# Patient Record
Sex: FEMALE | Race: WHITE | NOT HISPANIC OR LATINO
[De-identification: names, ages, dates, MRNs, and addresses within clinical notes are randomized per-mention and may not be internally consistent; named-entity substitution may affect disease eponyms.]

---

## 2017-04-14 ENCOUNTER — TRANSCRIPTION ENCOUNTER (OUTPATIENT)
Age: 62
End: 2017-04-14

## 2017-06-07 ENCOUNTER — APPOINTMENT (OUTPATIENT)
Dept: ENDOCRINOLOGY | Facility: CLINIC | Age: 62
End: 2017-06-07

## 2017-06-07 VITALS
HEART RATE: 55 BPM | SYSTOLIC BLOOD PRESSURE: 102 MMHG | HEIGHT: 61 IN | BODY MASS INDEX: 21.14 KG/M2 | OXYGEN SATURATION: 93 % | DIASTOLIC BLOOD PRESSURE: 62 MMHG | WEIGHT: 112 LBS

## 2017-06-07 RX ORDER — DENOSUMAB 60 MG/ML
60 INJECTION SUBCUTANEOUS
Qty: 1 | Refills: 0 | Status: COMPLETED | OUTPATIENT
Start: 2017-06-07

## 2017-06-07 RX ADMIN — DENOSUMAB 0 MG/ML: 60 INJECTION SUBCUTANEOUS at 00:00

## 2017-09-11 ENCOUNTER — TRANSCRIPTION ENCOUNTER (OUTPATIENT)
Age: 62
End: 2017-09-11

## 2017-10-31 ENCOUNTER — TRANSCRIPTION ENCOUNTER (OUTPATIENT)
Age: 62
End: 2017-10-31

## 2017-11-08 ENCOUNTER — TRANSCRIPTION ENCOUNTER (OUTPATIENT)
Age: 62
End: 2017-11-08

## 2017-12-11 ENCOUNTER — APPOINTMENT (OUTPATIENT)
Dept: ENDOCRINOLOGY | Facility: CLINIC | Age: 62
End: 2017-12-11
Payer: COMMERCIAL

## 2017-12-11 VITALS
HEIGHT: 61 IN | OXYGEN SATURATION: 95 % | HEART RATE: 66 BPM | SYSTOLIC BLOOD PRESSURE: 120 MMHG | DIASTOLIC BLOOD PRESSURE: 70 MMHG | WEIGHT: 110 LBS | BODY MASS INDEX: 20.77 KG/M2

## 2017-12-11 PROCEDURE — 96372 THER/PROPH/DIAG INJ SC/IM: CPT

## 2017-12-11 PROCEDURE — 99214 OFFICE O/P EST MOD 30 MIN: CPT | Mod: 25

## 2017-12-11 RX ORDER — MIRTAZAPINE 15 MG/1
15 TABLET, FILM COATED ORAL
Qty: 30 | Refills: 0 | Status: COMPLETED | COMMUNITY
Start: 2017-10-14

## 2017-12-11 RX ORDER — WARFARIN 10 MG/1
10 TABLET ORAL
Qty: 90 | Refills: 0 | Status: COMPLETED | COMMUNITY
Start: 2017-07-03

## 2017-12-11 RX ORDER — DENOSUMAB 60 MG/ML
60 INJECTION SUBCUTANEOUS
Qty: 0 | Refills: 0 | Status: COMPLETED | OUTPATIENT
Start: 2017-12-11

## 2017-12-11 RX ORDER — WARFARIN 2.5 MG/1
2.5 TABLET ORAL
Qty: 90 | Refills: 0 | Status: COMPLETED | COMMUNITY
Start: 2017-07-03

## 2017-12-11 RX ORDER — METOPROLOL SUCCINATE 25 MG/1
25 TABLET, EXTENDED RELEASE ORAL
Qty: 90 | Refills: 0 | Status: COMPLETED | COMMUNITY
Start: 2017-06-25

## 2017-12-11 RX ORDER — TOBRAMYCIN AND DEXAMETHASONE 3; 1 MG/G; MG/G
0.3-0.1 OINTMENT OPHTHALMIC
Qty: 4 | Refills: 0 | Status: COMPLETED | COMMUNITY
Start: 2017-11-28

## 2017-12-11 RX ORDER — AMITRIPTYLINE HYDROCHLORIDE 10 MG/1
10 TABLET, FILM COATED ORAL
Qty: 180 | Refills: 0 | Status: COMPLETED | COMMUNITY
Start: 2017-06-26

## 2017-12-11 RX ADMIN — DENOSUMAB 0 MG/ML: 60 INJECTION SUBCUTANEOUS at 00:00

## 2018-02-02 ENCOUNTER — TRANSCRIPTION ENCOUNTER (OUTPATIENT)
Age: 63
End: 2018-02-02

## 2018-02-07 ENCOUNTER — TRANSCRIPTION ENCOUNTER (OUTPATIENT)
Age: 63
End: 2018-02-07

## 2018-03-27 ENCOUNTER — TRANSCRIPTION ENCOUNTER (OUTPATIENT)
Age: 63
End: 2018-03-27

## 2018-04-07 ENCOUNTER — TRANSCRIPTION ENCOUNTER (OUTPATIENT)
Age: 63
End: 2018-04-07

## 2018-04-13 ENCOUNTER — EMERGENCY (EMERGENCY)
Facility: HOSPITAL | Age: 63
LOS: 1 days | Discharge: HOME | End: 2018-04-13
Attending: EMERGENCY MEDICINE
Payer: COMMERCIAL

## 2018-04-13 ENCOUNTER — OUTPATIENT (OUTPATIENT)
Dept: OUTPATIENT SERVICES | Facility: HOSPITAL | Age: 63
LOS: 1 days | End: 2018-04-13
Payer: COMMERCIAL

## 2018-04-13 ENCOUNTER — APPOINTMENT (OUTPATIENT)
Dept: CT IMAGING | Facility: CLINIC | Age: 63
End: 2018-04-13
Payer: COMMERCIAL

## 2018-04-13 VITALS
HEART RATE: 67 BPM | TEMPERATURE: 99 F | OXYGEN SATURATION: 99 % | RESPIRATION RATE: 19 BRPM | SYSTOLIC BLOOD PRESSURE: 169 MMHG | DIASTOLIC BLOOD PRESSURE: 79 MMHG

## 2018-04-13 VITALS
OXYGEN SATURATION: 99 % | RESPIRATION RATE: 16 BRPM | DIASTOLIC BLOOD PRESSURE: 69 MMHG | SYSTOLIC BLOOD PRESSURE: 108 MMHG | HEART RATE: 75 BPM

## 2018-04-13 DIAGNOSIS — Z00.8 ENCOUNTER FOR OTHER GENERAL EXAMINATION: ICD-10-CM

## 2018-04-13 LAB
ALBUMIN SERPL ELPH-MCNC: 4.3 G/DL — SIGNIFICANT CHANGE UP (ref 3.3–5)
ALP SERPL-CCNC: 41 U/L — SIGNIFICANT CHANGE UP (ref 40–120)
ALT FLD-CCNC: 24 U/L RC — SIGNIFICANT CHANGE UP (ref 10–45)
ANION GAP SERPL CALC-SCNC: 16 MMOL/L — SIGNIFICANT CHANGE UP (ref 5–17)
APPEARANCE UR: CLEAR — SIGNIFICANT CHANGE UP
APTT BLD: 48 SEC — HIGH (ref 27.5–37.4)
AST SERPL-CCNC: 25 U/L — SIGNIFICANT CHANGE UP (ref 10–40)
BASE EXCESS BLDV CALC-SCNC: 1.8 MMOL/L — SIGNIFICANT CHANGE UP (ref -2–2)
BASOPHILS # BLD AUTO: 0 K/UL — SIGNIFICANT CHANGE UP (ref 0–0.2)
BASOPHILS NFR BLD AUTO: 0.2 % — SIGNIFICANT CHANGE UP (ref 0–2)
BILIRUB SERPL-MCNC: 0.9 MG/DL — SIGNIFICANT CHANGE UP (ref 0.2–1.2)
BILIRUB UR-MCNC: NEGATIVE — SIGNIFICANT CHANGE UP
BUN SERPL-MCNC: 20 MG/DL — SIGNIFICANT CHANGE UP (ref 7–23)
CA-I SERPL-SCNC: 1.05 MMOL/L — LOW (ref 1.12–1.3)
CALCIUM SERPL-MCNC: 8.4 MG/DL — SIGNIFICANT CHANGE UP (ref 8.4–10.5)
CHLORIDE BLDV-SCNC: 100 MMOL/L — SIGNIFICANT CHANGE UP (ref 96–108)
CHLORIDE SERPL-SCNC: 94 MMOL/L — LOW (ref 96–108)
CO2 BLDV-SCNC: 28 MMOL/L — SIGNIFICANT CHANGE UP (ref 22–30)
CO2 SERPL-SCNC: 23 MMOL/L — SIGNIFICANT CHANGE UP (ref 22–31)
COLOR SPEC: YELLOW — SIGNIFICANT CHANGE UP
CREAT SERPL-MCNC: 0.69 MG/DL — SIGNIFICANT CHANGE UP (ref 0.5–1.3)
DIFF PNL FLD: ABNORMAL
EOSINOPHIL # BLD AUTO: 0 K/UL — SIGNIFICANT CHANGE UP (ref 0–0.5)
EOSINOPHIL NFR BLD AUTO: 0.1 % — SIGNIFICANT CHANGE UP (ref 0–6)
GAS PNL BLDV: 128 MMOL/L — LOW (ref 136–145)
GAS PNL BLDV: SIGNIFICANT CHANGE UP
GAS PNL BLDV: SIGNIFICANT CHANGE UP
GLUCOSE BLDV-MCNC: 106 MG/DL — HIGH (ref 70–99)
GLUCOSE SERPL-MCNC: 107 MG/DL — HIGH (ref 70–99)
GLUCOSE UR QL: NEGATIVE — SIGNIFICANT CHANGE UP
HCO3 BLDV-SCNC: 27 MMOL/L — SIGNIFICANT CHANGE UP (ref 21–29)
HCT VFR BLD CALC: 38.6 % — SIGNIFICANT CHANGE UP (ref 34.5–45)
HCT VFR BLDA CALC: 39 % — SIGNIFICANT CHANGE UP (ref 39–50)
HGB BLD CALC-MCNC: 12.6 G/DL — SIGNIFICANT CHANGE UP (ref 11.5–15.5)
HGB BLD-MCNC: 12.9 G/DL — SIGNIFICANT CHANGE UP (ref 11.5–15.5)
INR BLD: 2.83 RATIO — HIGH (ref 0.88–1.16)
KETONES UR-MCNC: ABNORMAL
LACTATE BLDV-MCNC: 2 MMOL/L — SIGNIFICANT CHANGE UP (ref 0.7–2)
LEUKOCYTE ESTERASE UR-ACNC: NEGATIVE — SIGNIFICANT CHANGE UP
LYMPHOCYTES # BLD AUTO: 0.9 K/UL — LOW (ref 1–3.3)
LYMPHOCYTES # BLD AUTO: 9 % — LOW (ref 13–44)
MCHC RBC-ENTMCNC: 30.7 PG — SIGNIFICANT CHANGE UP (ref 27–34)
MCHC RBC-ENTMCNC: 33.6 GM/DL — SIGNIFICANT CHANGE UP (ref 32–36)
MCV RBC AUTO: 91.5 FL — SIGNIFICANT CHANGE UP (ref 80–100)
MONOCYTES # BLD AUTO: 0.5 K/UL — SIGNIFICANT CHANGE UP (ref 0–0.9)
MONOCYTES NFR BLD AUTO: 4.8 % — SIGNIFICANT CHANGE UP (ref 2–14)
NEUTROPHILS # BLD AUTO: 8.2 K/UL — HIGH (ref 1.8–7.4)
NEUTROPHILS NFR BLD AUTO: 85.9 % — HIGH (ref 43–77)
NITRITE UR-MCNC: NEGATIVE — SIGNIFICANT CHANGE UP
OTHER CELLS CSF MANUAL: 9 ML/DL — LOW (ref 18–22)
PCO2 BLDV: 45 MMHG — SIGNIFICANT CHANGE UP (ref 35–50)
PH BLDV: 7.39 — SIGNIFICANT CHANGE UP (ref 7.35–7.45)
PH UR: 6.5 — SIGNIFICANT CHANGE UP (ref 5–8)
PLATELET # BLD AUTO: 212 K/UL — SIGNIFICANT CHANGE UP (ref 150–400)
PO2 BLDV: 31 MMHG — SIGNIFICANT CHANGE UP (ref 25–45)
POTASSIUM BLDV-SCNC: 3.5 MMOL/L — SIGNIFICANT CHANGE UP (ref 3.5–5)
POTASSIUM SERPL-MCNC: 3.8 MMOL/L — SIGNIFICANT CHANGE UP (ref 3.5–5.3)
POTASSIUM SERPL-SCNC: 3.8 MMOL/L — SIGNIFICANT CHANGE UP (ref 3.5–5.3)
PROT SERPL-MCNC: 7.2 G/DL — SIGNIFICANT CHANGE UP (ref 6–8.3)
PROT UR-MCNC: 100 MG/DL
PROTHROM AB SERPL-ACNC: 31.2 SEC — HIGH (ref 9.8–12.7)
RBC # BLD: 4.22 M/UL — SIGNIFICANT CHANGE UP (ref 3.8–5.2)
RBC # FLD: 11.9 % — SIGNIFICANT CHANGE UP (ref 10.3–14.5)
RBC CASTS # UR COMP ASSIST: >50 /HPF (ref 0–2)
SAO2 % BLDV: 52 % — LOW (ref 67–88)
SODIUM SERPL-SCNC: 133 MMOL/L — LOW (ref 135–145)
SP GR SPEC: 1.02 — SIGNIFICANT CHANGE UP (ref 1.01–1.02)
UROBILINOGEN FLD QL: NEGATIVE — SIGNIFICANT CHANGE UP
WBC # BLD: 9.5 K/UL — SIGNIFICANT CHANGE UP (ref 3.8–10.5)
WBC # FLD AUTO: 9.5 K/UL — SIGNIFICANT CHANGE UP (ref 3.8–10.5)
WBC UR QL: SIGNIFICANT CHANGE UP /HPF (ref 0–5)

## 2018-04-13 PROCEDURE — 82803 BLOOD GASES ANY COMBINATION: CPT

## 2018-04-13 PROCEDURE — 83605 ASSAY OF LACTIC ACID: CPT

## 2018-04-13 PROCEDURE — 87086 URINE CULTURE/COLONY COUNT: CPT

## 2018-04-13 PROCEDURE — 96376 TX/PRO/DX INJ SAME DRUG ADON: CPT

## 2018-04-13 PROCEDURE — 85730 THROMBOPLASTIN TIME PARTIAL: CPT

## 2018-04-13 PROCEDURE — 80053 COMPREHEN METABOLIC PANEL: CPT

## 2018-04-13 PROCEDURE — 84132 ASSAY OF SERUM POTASSIUM: CPT

## 2018-04-13 PROCEDURE — 82947 ASSAY GLUCOSE BLOOD QUANT: CPT

## 2018-04-13 PROCEDURE — 85027 COMPLETE CBC AUTOMATED: CPT

## 2018-04-13 PROCEDURE — 82435 ASSAY OF BLOOD CHLORIDE: CPT

## 2018-04-13 PROCEDURE — 96374 THER/PROPH/DIAG INJ IV PUSH: CPT

## 2018-04-13 PROCEDURE — 81001 URINALYSIS AUTO W/SCOPE: CPT

## 2018-04-13 PROCEDURE — 74176 CT ABD & PELVIS W/O CONTRAST: CPT

## 2018-04-13 PROCEDURE — 99284 EMERGENCY DEPT VISIT MOD MDM: CPT | Mod: 25

## 2018-04-13 PROCEDURE — 99284 EMERGENCY DEPT VISIT MOD MDM: CPT

## 2018-04-13 PROCEDURE — 85610 PROTHROMBIN TIME: CPT

## 2018-04-13 PROCEDURE — 74176 CT ABD & PELVIS W/O CONTRAST: CPT | Mod: 26

## 2018-04-13 PROCEDURE — 82330 ASSAY OF CALCIUM: CPT

## 2018-04-13 PROCEDURE — 84295 ASSAY OF SERUM SODIUM: CPT

## 2018-04-13 PROCEDURE — 85014 HEMATOCRIT: CPT

## 2018-04-13 PROCEDURE — 96375 TX/PRO/DX INJ NEW DRUG ADDON: CPT

## 2018-04-13 RX ORDER — OXYCODONE HYDROCHLORIDE 5 MG/1
1 TABLET ORAL
Qty: 6 | Refills: 0 | OUTPATIENT
Start: 2018-04-13 | End: 2018-04-14

## 2018-04-13 RX ORDER — ACETAMINOPHEN 500 MG
1000 TABLET ORAL ONCE
Qty: 0 | Refills: 0 | Status: COMPLETED | OUTPATIENT
Start: 2018-04-13 | End: 2018-04-13

## 2018-04-13 RX ORDER — ONDANSETRON 8 MG/1
4 TABLET, FILM COATED ORAL ONCE
Qty: 0 | Refills: 0 | Status: COMPLETED | OUTPATIENT
Start: 2018-04-13 | End: 2018-04-13

## 2018-04-13 RX ORDER — MORPHINE SULFATE 50 MG/1
4 CAPSULE, EXTENDED RELEASE ORAL ONCE
Qty: 0 | Refills: 0 | Status: DISCONTINUED | OUTPATIENT
Start: 2018-04-13 | End: 2018-04-13

## 2018-04-13 RX ORDER — SODIUM CHLORIDE 9 MG/ML
1000 INJECTION INTRAMUSCULAR; INTRAVENOUS; SUBCUTANEOUS
Qty: 0 | Refills: 0 | Status: DISCONTINUED | OUTPATIENT
Start: 2018-04-13 | End: 2018-04-17

## 2018-04-13 RX ORDER — SODIUM CHLORIDE 9 MG/ML
1000 INJECTION INTRAMUSCULAR; INTRAVENOUS; SUBCUTANEOUS ONCE
Qty: 0 | Refills: 0 | Status: COMPLETED | OUTPATIENT
Start: 2018-04-13 | End: 2018-04-13

## 2018-04-13 RX ADMIN — MORPHINE SULFATE 4 MILLIGRAM(S): 50 CAPSULE, EXTENDED RELEASE ORAL at 21:21

## 2018-04-13 RX ADMIN — SODIUM CHLORIDE 150 MILLILITER(S): 9 INJECTION INTRAMUSCULAR; INTRAVENOUS; SUBCUTANEOUS at 21:59

## 2018-04-13 RX ADMIN — Medication 400 MILLIGRAM(S): at 19:57

## 2018-04-13 RX ADMIN — ONDANSETRON 4 MILLIGRAM(S): 8 TABLET, FILM COATED ORAL at 21:21

## 2018-04-13 RX ADMIN — SODIUM CHLORIDE 500 MILLILITER(S): 9 INJECTION INTRAMUSCULAR; INTRAVENOUS; SUBCUTANEOUS at 19:57

## 2018-04-13 RX ADMIN — ONDANSETRON 4 MILLIGRAM(S): 8 TABLET, FILM COATED ORAL at 19:57

## 2018-04-13 NOTE — ED ADULT NURSE NOTE - OBJECTIVE STATEMENT
Pt presents to ED awake, alert and ambulatory, c/o right flank pain. Pt states she her systemic lupus and saw her GI who did an outpatient CT. Pt feels nausea, had vomiting at home. + BL CVA tenderness. Pt appears with normal skin color for race. Respirations even and unlabored.

## 2018-04-13 NOTE — ED PROVIDER NOTE - MEDICAL DECISION MAKING DETAILS
Pt with right flank and CVA pain and tenderness, recent uti, probably kidney stone vs pyelonephritis. Plan: labs, pain control, iv hydration, and re-evaluate.

## 2018-04-13 NOTE — ED PROVIDER NOTE - OBJECTIVE STATEMENT
62 yo f with pmhx of systemic lupus presents with 8/10 right flank pain radiating to back. Pt reports the pain started last night and is a severe 8/10 pain. Associated with n/v and low-grade fever. Pt went to her GI doctor where she had a ct scan w/o contrast that showed pelvic fullness with mild streaky perinephric changes. Referred to ED by her rheumatologist. Pt had recent UTI, not on antibiotics. Pt is on coumadin.

## 2018-04-13 NOTE — ED PROVIDER NOTE - PROGRESS NOTE DETAILS
Pt feeling better. No abdominal pain, n/v. Labs and ct scan done earlier, reviewed with pt and . Very likely pain related to uretal colic, improved. D/C home to be followed with PMD.

## 2018-04-15 LAB
CULTURE RESULTS: NO GROWTH — SIGNIFICANT CHANGE UP
SPECIMEN SOURCE: SIGNIFICANT CHANGE UP

## 2018-06-18 ENCOUNTER — APPOINTMENT (OUTPATIENT)
Dept: ENDOCRINOLOGY | Facility: CLINIC | Age: 63
End: 2018-06-18
Payer: COMMERCIAL

## 2018-06-18 VITALS — WEIGHT: 111 LBS | HEIGHT: 60.5 IN | BODY MASS INDEX: 21.23 KG/M2

## 2018-06-18 VITALS — OXYGEN SATURATION: 97 % | DIASTOLIC BLOOD PRESSURE: 64 MMHG | HEART RATE: 67 BPM | SYSTOLIC BLOOD PRESSURE: 110 MMHG

## 2018-06-18 PROCEDURE — 96372 THER/PROPH/DIAG INJ SC/IM: CPT

## 2018-06-18 PROCEDURE — 77080 DXA BONE DENSITY AXIAL: CPT

## 2018-06-18 PROCEDURE — 99214 OFFICE O/P EST MOD 30 MIN: CPT | Mod: 25

## 2018-06-18 PROCEDURE — ZZZZZ: CPT

## 2018-06-18 RX ORDER — DENOSUMAB 60 MG/ML
60 INJECTION SUBCUTANEOUS
Qty: 0 | Refills: 0 | Status: COMPLETED | OUTPATIENT
Start: 2018-06-18

## 2018-06-18 RX ORDER — HYDROXYCHLOROQUINE SULFATE 200 MG/1
200 TABLET ORAL
Refills: 0 | Status: DISCONTINUED | COMMUNITY
End: 2018-06-18

## 2018-06-18 RX ORDER — OMEGA-3/DHA/EPA/FISH OIL 300-1000MG
CAPSULE ORAL
Refills: 0 | Status: ACTIVE | COMMUNITY

## 2018-06-18 RX ADMIN — DENOSUMAB 0 MG/ML: 60 INJECTION SUBCUTANEOUS at 00:00

## 2018-10-16 ENCOUNTER — TRANSCRIPTION ENCOUNTER (OUTPATIENT)
Age: 63
End: 2018-10-16

## 2019-01-04 ENCOUNTER — APPOINTMENT (OUTPATIENT)
Dept: ENDOCRINOLOGY | Facility: CLINIC | Age: 64
End: 2019-01-04
Payer: COMMERCIAL

## 2019-01-04 VITALS
WEIGHT: 110 LBS | HEIGHT: 60.5 IN | OXYGEN SATURATION: 98 % | DIASTOLIC BLOOD PRESSURE: 64 MMHG | HEART RATE: 76 BPM | BODY MASS INDEX: 21.04 KG/M2 | SYSTOLIC BLOOD PRESSURE: 100 MMHG

## 2019-01-04 PROBLEM — D68.61 ANTIPHOSPHOLIPID SYNDROME: Chronic | Status: ACTIVE | Noted: 2018-04-17

## 2019-01-04 PROBLEM — I82.409 ACUTE EMBOLISM AND THROMBOSIS OF UNSPECIFIED DEEP VEINS OF UNSPECIFIED LOWER EXTREMITY: Chronic | Status: ACTIVE | Noted: 2018-04-17

## 2019-01-04 PROBLEM — M32.9 SYSTEMIC LUPUS ERYTHEMATOSUS, UNSPECIFIED: Chronic | Status: ACTIVE | Noted: 2018-04-17

## 2019-01-04 PROCEDURE — 99214 OFFICE O/P EST MOD 30 MIN: CPT | Mod: 25

## 2019-01-04 PROCEDURE — 96401 CHEMO ANTI-NEOPL SQ/IM: CPT

## 2019-01-04 RX ORDER — DENOSUMAB 60 MG/ML
60 INJECTION SUBCUTANEOUS
Qty: 1 | Refills: 0 | Status: COMPLETED | OUTPATIENT
Start: 2019-01-04

## 2019-01-04 RX ADMIN — DENOSUMAB 0 MG/ML: 60 INJECTION SUBCUTANEOUS at 00:00

## 2019-01-04 NOTE — REVIEW OF SYSTEMS
[All other systems negative] : All other systems negative [Fatigue] : no fatigue [Joint Pain] : no joint pain [Joint Stiffness] : no joint stiffness

## 2019-01-04 NOTE — PAST MEDICAL HISTORY
[Menopause Age____] : age at menopause was [unfilled] [History of Hormone Replacement Treatment] : has no history of hormone replacement treatment

## 2019-01-04 NOTE — PHYSICAL EXAM
[Alert] : alert [No Acute Distress] : no acute distress [Well Nourished] : well nourished [Well Developed] : well developed [Normal Sclera/Conjunctiva] : normal sclera/conjunctiva [No Proptosis] : no proptosis [Normal Oropharynx] : the oropharynx was normal [Thyroid Not Enlarged] : the thyroid was not enlarged [No Thyroid Nodules] : there were no palpable thyroid nodules [No Respiratory Distress] : no respiratory distress [No Accessory Muscle Use] : no accessory muscle use [Clear to Auscultation] : lungs were clear to auscultation bilaterally [Normal Rate] : heart rate was normal  [Normal S1, S2] : normal S1 and S2 [Regular Rhythm] : with a regular rhythm [Normal Bowel Sounds] : normal bowel sounds [Not Tender] : non-tender [Soft] : abdomen soft [Not Distended] : not distended [Anterior Cervical Nodes] : anterior cervical nodes [Normal] : normal and non tender [No Spinal Tenderness] : no spinal tenderness [Spine Straight] : spine straight [No Stigmata of Cushings Syndrome] : no stigmata of cushings syndrome [Normal Gait] : normal gait [Normal Strength/Tone] : muscle strength and tone were normal [No Rash] : no rash [Normal Reflexes] : deep tendon reflexes were 2+ and symmetric [No Tremors] : no tremors [Oriented x3] : oriented to person, place, and time

## 2019-01-04 NOTE — HISTORY OF PRESENT ILLNESS
[Ibandronate (Boniva)] : Ibandronate [Patient taking Meds Correctly] : Patient is taking meds correctly [Prolia (Denosumab)] : Prolia (Denosumab) [FreeTextEntry1] :  f/u for 63 year-old female with osteoporosis.\par \par Pt had been taking Boniva for approximately 4-1/2 years took a one year drug holiday 2014. BMD decreased on drug holiday. On Prolia since 5/ 2015. Tolerating well, no new fx. BMD 5/2016 improved. Prior BMD spring 2015 decreased. Last DDS 6 weeks ago, underwent extraction of old implant. No ONJ. \par \par After Nov 2016, had a fall (barrier causing a slip on the floor) at home with no injuries. No gait instability. Plays tennis once/week. Goes to a  twice a week.\par \par Has Antiphospholipid Ab syndrome and Lupus. Last DVT was ~2010. On Coumadin. Takes Plaquenil for Lupus. Stable.

## 2019-01-04 NOTE — PROCEDURE
[FreeTextEntry1] : Bone mineral density: 06/18/2018 \par Indication: vs 2016 study\par Spine: -2.4, osteopenia, (+6.1%)\par Total hip: -1.6, osteopenia, (+7.7%)\par Femoral neck: -1.7, osteopenia, (+12.9%)\par Proximal radius: -1.2, osteopenia, no significant change \par \par Bone mineral density test 5/23/16\par Indication assess response to Prolia\par Spine -2.8, osteoporosis, +4.0% versus 2015\par Total hip -2.0, osteopenia, no significant change\par Femoral neck -2.4, osteopenia, no significant change\par Proximal radius -1.3, osteopenia, +3.6%\par \par bone mineral density test performed April 17, 2015\par Spine -3.0, osteoporosis, -3.9% versus 2013\par Total hip -2.1, osteopenia, -6.3% versus 2013\par femoral neck -2.3 osteopenia, no significant change versus 2013\par Proximal radius -1.7, osteopenia, -3.9% versus 2013

## 2019-01-04 NOTE — ASSESSMENT
[FreeTextEntry1] : 63 year-old female  with osteoporosis. \par \par Pt had bone loss on Boniva holiday for one year after approximately 4 1/2 years of taking it correctly. Began Prolia in 05/2015. Tolerating well. No thigh pain. No ONJ. No interval fx. Repeat BMD 2016 improved in spine and proximal radius. BMD 2018 showed significant improvement in the spine and hip, consistent with osteopenia in all sites. \par \par I discussed that pt can not stop Prolia without expecting rapid bone loss and increase in risk for future fx. Further, there is 10 year safety data for Prolia. Pt would have to transition to bisphosphate treatment to benefit from a future drug holiday. Continue Prolia from [facility]. \par \par Labs from Dr. Brooks 3 mons ago. f/u in 6 mons.  [Denosumab Therapy] : Risks  and benefits of denosumab therapy were discussed with the patient including eczema, cellulitis, osteonecrosis of the jaw and atypical femur fractures

## 2019-03-19 ENCOUNTER — TRANSCRIPTION ENCOUNTER (OUTPATIENT)
Age: 64
End: 2019-03-19

## 2019-04-01 ENCOUNTER — RESULT REVIEW (OUTPATIENT)
Age: 64
End: 2019-04-01

## 2019-05-14 NOTE — ED PROVIDER NOTE - CONSTITUTIONAL, MLM
[Mother] : mother normal... Well appearing, well nourished, awake, alert, oriented to person, place, time/situation and in no apparent distress.

## 2019-06-11 ENCOUNTER — TRANSCRIPTION ENCOUNTER (OUTPATIENT)
Age: 64
End: 2019-06-11

## 2019-07-10 ENCOUNTER — MEDICATION RENEWAL (OUTPATIENT)
Age: 64
End: 2019-07-10

## 2019-07-17 ENCOUNTER — APPOINTMENT (OUTPATIENT)
Dept: ENDOCRINOLOGY | Facility: CLINIC | Age: 64
End: 2019-07-17

## 2019-07-26 ENCOUNTER — MEDICATION RENEWAL (OUTPATIENT)
Age: 64
End: 2019-07-26

## 2019-08-12 ENCOUNTER — TRANSCRIPTION ENCOUNTER (OUTPATIENT)
Age: 64
End: 2019-08-12

## 2019-08-15 ENCOUNTER — TRANSCRIPTION ENCOUNTER (OUTPATIENT)
Age: 64
End: 2019-08-15

## 2019-08-23 ENCOUNTER — RX CHANGE (OUTPATIENT)
Age: 64
End: 2019-08-23

## 2019-08-23 RX ORDER — DENOSUMAB 60 MG/ML
60 INJECTION SUBCUTANEOUS
Qty: 1 | Refills: 1 | Status: DISCONTINUED | COMMUNITY
Start: 2019-07-26 | End: 2019-08-23

## 2019-09-10 ENCOUNTER — MEDICATION RENEWAL (OUTPATIENT)
Age: 64
End: 2019-09-10

## 2019-09-21 ENCOUNTER — TRANSCRIPTION ENCOUNTER (OUTPATIENT)
Age: 64
End: 2019-09-21

## 2019-10-03 ENCOUNTER — APPOINTMENT (OUTPATIENT)
Dept: ENDOCRINOLOGY | Facility: CLINIC | Age: 64
End: 2019-10-03
Payer: COMMERCIAL

## 2019-10-03 VITALS
WEIGHT: 110 LBS | OXYGEN SATURATION: 98 % | HEART RATE: 68 BPM | HEIGHT: 60.5 IN | BODY MASS INDEX: 21.04 KG/M2 | SYSTOLIC BLOOD PRESSURE: 120 MMHG | DIASTOLIC BLOOD PRESSURE: 70 MMHG

## 2019-10-03 PROCEDURE — 96401 CHEMO ANTI-NEOPL SQ/IM: CPT

## 2019-10-03 PROCEDURE — 99214 OFFICE O/P EST MOD 30 MIN: CPT | Mod: 25

## 2019-10-03 RX ORDER — DENOSUMAB 60 MG/ML
60 INJECTION SUBCUTANEOUS
Qty: 1 | Refills: 1 | Status: DISCONTINUED | COMMUNITY
Start: 2019-07-10 | End: 2019-10-03

## 2019-10-03 RX ORDER — DENOSUMAB 60 MG/ML
60 INJECTION SUBCUTANEOUS
Qty: 1 | Refills: 0 | Status: COMPLETED | OUTPATIENT
Start: 2019-10-03

## 2019-10-03 RX ORDER — DENOSUMAB 60 MG/ML
60 INJECTION SUBCUTANEOUS
Qty: 1 | Refills: 1 | Status: DISCONTINUED | COMMUNITY
Start: 2019-08-23 | End: 2019-10-03

## 2019-10-03 RX ADMIN — DENOSUMAB 60 MG/ML: 60 INJECTION SUBCUTANEOUS at 00:00

## 2019-10-03 NOTE — PHYSICAL EXAM
[Alert] : alert [No Acute Distress] : no acute distress [Well Nourished] : well nourished [Well Developed] : well developed [Normal Sclera/Conjunctiva] : normal sclera/conjunctiva [No Proptosis] : no proptosis [Normal Oropharynx] : the oropharynx was normal [Thyroid Not Enlarged] : the thyroid was not enlarged [No Thyroid Nodules] : there were no palpable thyroid nodules [No Respiratory Distress] : no respiratory distress [No Accessory Muscle Use] : no accessory muscle use [Clear to Auscultation] : lungs were clear to auscultation bilaterally [Normal Rate] : heart rate was normal  [Normal S1, S2] : normal S1 and S2 [Normal Bowel Sounds] : normal bowel sounds [Regular Rhythm] : with a regular rhythm [Soft] : abdomen soft [Not Tender] : non-tender [Not Distended] : not distended [Normal] : normal and non tender [Anterior Cervical Nodes] : anterior cervical nodes [No Spinal Tenderness] : no spinal tenderness [Spine Straight] : spine straight [No Stigmata of Cushings Syndrome] : no stigmata of cushings syndrome [Normal Gait] : normal gait [Normal Strength/Tone] : muscle strength and tone were normal [No Rash] : no rash [Normal Reflexes] : deep tendon reflexes were 2+ and symmetric [No Tremors] : no tremors [Oriented x3] : oriented to person, place, and time

## 2019-10-04 ENCOUNTER — TRANSCRIPTION ENCOUNTER (OUTPATIENT)
Age: 64
End: 2019-10-04

## 2019-10-04 NOTE — END OF VISIT
[FreeTextEntry3] : I, Lan Sun, authored this note working as a medical scribe for Dr. Hameed.  10/03/2019.  9:15AM. This note was authored by the medical scribe for me. I have reviewed, edited, and revised the note as needed. I am in agreement with the exam findings, imaging findings, and treatment plan.  Kimani Hameed MD

## 2019-10-04 NOTE — ASSESSMENT
[Denosumab Therapy] : Risks  and benefits of denosumab therapy were discussed with the patient including eczema, cellulitis, osteonecrosis of the jaw and atypical femur fractures [FreeTextEntry1] : 64 year-old female  with osteoporosis. \par \par Pt had bone loss on Boniva holiday. Began Prolia in 05/2015. Tolerating well. No thigh pain. No ONJ. No interval fx. Repeat BMD 2016 improved in spine and proximal radius. BMD 2018 showed significant improvement in the spine and hip, consistent with osteopenia in all sites. \par \par Continue Prolia from [facility]. Previous Prolia from [facility]. Changed due to insurance issues. \par \par F/u in 6 mons for Prolia with the nurse.

## 2019-10-04 NOTE — HISTORY OF PRESENT ILLNESS
[Ibandronate (Boniva)] : Ibandronate [Patient taking Meds Correctly] : Patient is taking meds correctly [Prolia (Denosumab)] : Prolia (Denosumab) [FreeTextEntry1] : F/u for 64 year-old female with osteoporosis.\par \par Pt had been taking Boniva for approximately 4-1/2 years took a one year drug holiday 2014. BMD decreased on drug holiday. On Prolia since 5/ 2015. Tolerating well, no new fx. BMD 5/2016 improved. Prior BMD spring 2015 decreased. Pt underwent extraction of old implant 6 months ago. Pt is going to have another implant soon. No ONJ. \par \par After Nov 2016, had a fall (barrier causing a slip on the floor) at home with no injuries. No gait instability. Plays tennis once/week. Goes to a  twice a week.\par \par Has Antiphospholipid Ab syndrome and Lupus. Last DVT was ~2010. On Coumadin. Takes Plaquenil for Lupus. Stable.

## 2020-01-07 ENCOUNTER — APPOINTMENT (OUTPATIENT)
Dept: FAMILY MEDICINE | Facility: CLINIC | Age: 65
End: 2020-01-07
Payer: COMMERCIAL

## 2020-01-07 VITALS
DIASTOLIC BLOOD PRESSURE: 70 MMHG | WEIGHT: 110 LBS | OXYGEN SATURATION: 97 % | HEIGHT: 60.5 IN | SYSTOLIC BLOOD PRESSURE: 120 MMHG | BODY MASS INDEX: 21.04 KG/M2 | TEMPERATURE: 98.3 F | RESPIRATION RATE: 14 BRPM | HEART RATE: 69 BPM

## 2020-01-07 DIAGNOSIS — Z83.3 FAMILY HISTORY OF DIABETES MELLITUS: ICD-10-CM

## 2020-01-07 DIAGNOSIS — Z87.448 PERSONAL HISTORY OF OTHER DISEASES OF URINARY SYSTEM: ICD-10-CM

## 2020-01-07 DIAGNOSIS — B34.9 VIRAL INFECTION, UNSPECIFIED: ICD-10-CM

## 2020-01-07 DIAGNOSIS — I34.1 NONRHEUMATIC MITRAL (VALVE) PROLAPSE: ICD-10-CM

## 2020-01-07 DIAGNOSIS — Z80.0 FAMILY HISTORY OF MALIGNANT NEOPLASM OF DIGESTIVE ORGANS: ICD-10-CM

## 2020-01-07 DIAGNOSIS — E78.5 HYPERLIPIDEMIA, UNSPECIFIED: ICD-10-CM

## 2020-01-07 DIAGNOSIS — D64.9 ANEMIA, UNSPECIFIED: ICD-10-CM

## 2020-01-07 PROCEDURE — 36415 COLL VENOUS BLD VENIPUNCTURE: CPT

## 2020-01-07 PROCEDURE — 99203 OFFICE O/P NEW LOW 30 MIN: CPT | Mod: 25

## 2020-01-07 RX ORDER — ENALAPRIL MALEATE 5 MG/1
5 TABLET ORAL
Refills: 0 | Status: ACTIVE | COMMUNITY

## 2020-01-07 RX ORDER — FOLIC ACID 1 MG/1
1 TABLET ORAL
Refills: 0 | Status: ACTIVE | COMMUNITY

## 2020-01-07 RX ORDER — METOPROLOL SUCCINATE 25 MG/1
25 TABLET, EXTENDED RELEASE ORAL
Refills: 0 | Status: ACTIVE | COMMUNITY

## 2020-01-07 RX ORDER — WARFARIN SODIUM 6 MG/1
TABLET ORAL
Refills: 0 | Status: ACTIVE | COMMUNITY

## 2020-01-07 RX ORDER — CHROMIUM 200 MCG
TABLET ORAL
Refills: 0 | Status: DISCONTINUED | COMMUNITY
End: 2020-01-07

## 2020-01-07 NOTE — HEALTH RISK ASSESSMENT
[Patient reported PAP Smear was normal] : Patient reported PAP Smear was normal [Patient reported mammogram was normal] : Patient reported mammogram was normal [Patient reported colonoscopy was normal] : Patient reported colonoscopy was normal [MammogramDate] : 11/19 [PapSmearDate] : 11/19 [ColonoscopyDate] : 12/19

## 2020-01-07 NOTE — HISTORY OF PRESENT ILLNESS
[FreeTextEntry8] : 64 y.o. F with PMHx of SLE, antiphospholipid antibody syndrome, hx of glomerulonephritis, osteoporosis, MVP, HTN, HLD, and anemia presents as new pt to establish care and sick visit. Pt has been experiencing 4 days of pain in ears, head, and neck, low energy, mild runny nose and cough, dry throat, and mild SOB. Pt has been taking claritin and flonase. No fevers or chills. She has been traveling a lot over past few weeks so has not been able to rest. Has been around her grandchildren.  \par \par \par \par \par \par

## 2020-01-07 NOTE — PLAN
[FreeTextEntry1] : -symptoms likely viral, advise supportive care for now\par -check labs \par \par HCM: \par -pt has extensive blood work from her specialists, advised for copies to be sent over \par -UTD with mammo, pap, colonoscopy \par -osteoporosis managed by endo \par -UTD with flu vaccine\par -advised by rheumatologist not to get pneumonia vaccines

## 2020-01-09 LAB
ALBUMIN SERPL ELPH-MCNC: 4.3 G/DL
ALP BLD-CCNC: 43 U/L
ALT SERPL-CCNC: 24 U/L
ANION GAP SERPL CALC-SCNC: 13 MMOL/L
AST SERPL-CCNC: 24 U/L
BASOPHILS # BLD AUTO: 0.03 K/UL
BASOPHILS NFR BLD AUTO: 0.8 %
BILIRUB SERPL-MCNC: 0.5 MG/DL
BUN SERPL-MCNC: 19 MG/DL
CALCIUM SERPL-MCNC: 9.4 MG/DL
CHLORIDE SERPL-SCNC: 105 MMOL/L
CO2 SERPL-SCNC: 25 MMOL/L
CREAT SERPL-MCNC: 0.75 MG/DL
EOSINOPHIL # BLD AUTO: 0.02 K/UL
EOSINOPHIL NFR BLD AUTO: 0.5 %
GLUCOSE SERPL-MCNC: 90 MG/DL
HCT VFR BLD CALC: 38.9 %
HGB BLD-MCNC: 12.6 G/DL
IMM GRANULOCYTES NFR BLD AUTO: 0.3 %
LYMPHOCYTES # BLD AUTO: 1.26 K/UL
LYMPHOCYTES NFR BLD AUTO: 32.1 %
MAN DIFF?: NORMAL
MCHC RBC-ENTMCNC: 30.1 PG
MCHC RBC-ENTMCNC: 32.4 GM/DL
MCV RBC AUTO: 92.8 FL
MONOCYTES # BLD AUTO: 0.31 K/UL
MONOCYTES NFR BLD AUTO: 7.9 %
NEUTROPHILS # BLD AUTO: 2.3 K/UL
NEUTROPHILS NFR BLD AUTO: 58.4 %
PLATELET # BLD AUTO: 235 K/UL
POTASSIUM SERPL-SCNC: 4.5 MMOL/L
PROT SERPL-MCNC: 6.5 G/DL
RBC # BLD: 4.19 M/UL
RBC # FLD: 13.8 %
SODIUM SERPL-SCNC: 143 MMOL/L
WBC # FLD AUTO: 3.93 K/UL

## 2020-04-06 ENCOUNTER — APPOINTMENT (OUTPATIENT)
Dept: ENDOCRINOLOGY | Facility: CLINIC | Age: 65
End: 2020-04-06

## 2020-04-20 ENCOUNTER — APPOINTMENT (OUTPATIENT)
Dept: ENDOCRINOLOGY | Facility: CLINIC | Age: 65
End: 2020-04-20

## 2020-04-24 ENCOUNTER — APPOINTMENT (OUTPATIENT)
Dept: PEDIATRICS | Facility: CLINIC | Age: 65
End: 2020-04-24
Payer: SELF-PAY

## 2020-04-24 PROCEDURE — 99213 OFFICE O/P EST LOW 20 MIN: CPT | Mod: 95

## 2020-04-24 NOTE — HISTORY OF PRESENT ILLNESS
[Verbal consent obtained from patient] : the patient, [unfilled] [FreeTextEntry1] : 65 YEARS OLD FEMALE HAD A TELEHEALTH VISIT BECAUSE SHE WANTS ANTIBODY TESTING DONE\par SHE TRAVEELED 6 TIMES IN FEBRUARY BETWEEN [place] AND [place] AND LATER PART OF FEBRUARY HAD LOW GRADE FEVER AND COUGH AND SHE THINKS SHE WENT THRU [facility] AT THAT TIME\par SHE FEELS FINE NOW AND SHE WANTS ANTIBODY TESTING DONE\par CALLED HER PMD WHO ADVISED HER TO CALL COVID 19 HOT LINE

## 2020-04-27 ENCOUNTER — TRANSCRIPTION ENCOUNTER (OUTPATIENT)
Age: 65
End: 2020-04-27

## 2020-05-11 ENCOUNTER — APPOINTMENT (OUTPATIENT)
Dept: ENDOCRINOLOGY | Facility: CLINIC | Age: 65
End: 2020-05-11
Payer: COMMERCIAL

## 2020-05-11 PROCEDURE — 96401 CHEMO ANTI-NEOPL SQ/IM: CPT

## 2020-05-11 RX ORDER — DENOSUMAB 60 MG/ML
60 INJECTION SUBCUTANEOUS
Qty: 1 | Refills: 0 | Status: COMPLETED | OUTPATIENT
Start: 2020-05-11

## 2020-05-11 RX ADMIN — DENOSUMAB 0 MG/ML: 60 INJECTION SUBCUTANEOUS at 00:00

## 2020-06-10 ENCOUNTER — RESULT REVIEW (OUTPATIENT)
Age: 65
End: 2020-06-10

## 2020-06-18 ENCOUNTER — APPOINTMENT (OUTPATIENT)
Dept: ENDOCRINOLOGY | Facility: CLINIC | Age: 65
End: 2020-06-18
Payer: COMMERCIAL

## 2020-06-18 VITALS — BODY MASS INDEX: 20.93 KG/M2 | WEIGHT: 108 LBS | TEMPERATURE: 98.2 F | HEIGHT: 60.3 IN

## 2020-06-18 PROCEDURE — 77080 DXA BONE DENSITY AXIAL: CPT

## 2020-07-14 ENCOUNTER — TRANSCRIPTION ENCOUNTER (OUTPATIENT)
Age: 65
End: 2020-07-14

## 2020-10-02 ENCOUNTER — APPOINTMENT (OUTPATIENT)
Dept: INTERNAL MEDICINE | Facility: CLINIC | Age: 65
End: 2020-10-02
Payer: COMMERCIAL

## 2020-10-02 VITALS
RESPIRATION RATE: 14 BRPM | HEART RATE: 67 BPM | WEIGHT: 110 LBS | TEMPERATURE: 97.9 F | SYSTOLIC BLOOD PRESSURE: 118 MMHG | OXYGEN SATURATION: 98 % | BODY MASS INDEX: 21.27 KG/M2 | DIASTOLIC BLOOD PRESSURE: 78 MMHG

## 2020-10-02 DIAGNOSIS — H92.02 OTALGIA, LEFT EAR: ICD-10-CM

## 2020-10-02 PROCEDURE — 99213 OFFICE O/P EST LOW 20 MIN: CPT

## 2020-10-02 NOTE — HISTORY OF PRESENT ILLNESS
[FreeTextEntry8] : Pt with complaint of left ear pain, sharp, intermittent for 3 days. No runny nose, sore throat, fevers/chills, cough. No trauma to ear.

## 2020-10-02 NOTE — PLAN
[FreeTextEntry1] : -b/l TMs non-erythematous\par -advise tylenol as needed for pain, cannot tolerate NSAIDs\par -flonase BID

## 2020-10-23 ENCOUNTER — TRANSCRIPTION ENCOUNTER (OUTPATIENT)
Age: 65
End: 2020-10-23

## 2020-11-23 ENCOUNTER — APPOINTMENT (OUTPATIENT)
Dept: ENDOCRINOLOGY | Facility: CLINIC | Age: 65
End: 2020-11-23
Payer: COMMERCIAL

## 2020-11-23 VITALS
BODY MASS INDEX: 21.27 KG/M2 | OXYGEN SATURATION: 98 % | SYSTOLIC BLOOD PRESSURE: 110 MMHG | WEIGHT: 110 LBS | DIASTOLIC BLOOD PRESSURE: 64 MMHG | TEMPERATURE: 97.9 F | HEART RATE: 61 BPM

## 2020-11-23 PROCEDURE — 99213 OFFICE O/P EST LOW 20 MIN: CPT | Mod: 25

## 2020-11-23 PROCEDURE — 96401 CHEMO ANTI-NEOPL SQ/IM: CPT

## 2020-11-23 RX ORDER — DENOSUMAB 60 MG/ML
60 INJECTION SUBCUTANEOUS
Qty: 1 | Refills: 0 | Status: COMPLETED | OUTPATIENT
Start: 2020-11-23

## 2020-11-23 RX ADMIN — DENOSUMAB 60 MG/ML: 60 INJECTION SUBCUTANEOUS at 00:00

## 2020-11-23 NOTE — REASON FOR VISIT
[Follow - Up] : a follow-up visit [Osteoporosis] : osteoporosis [Follow-Up: _____] : a [unfilled] follow-up visit

## 2020-11-24 NOTE — ASSESSMENT
[Denosumab Therapy] : Risks  and benefits of denosumab therapy were discussed with the patient including eczema, cellulitis, osteonecrosis of the jaw and atypical femur fractures [FreeTextEntry1] : 65 year-old female with osteoporosis. \par \par Pt had bone loss on Boniva holiday. Began Prolia in 05/2015. Tolerating well. No thigh pain. No ONJ. No interval fx. Repeat BMD 2016 improved in spine and proximal radius. BMD 2018 showed significant improvement in the spine and hip, consistent with osteopenia in all sites. Bone Density 6/18/2020 shows decrease in BMD in spine: -2.6, -3.3% v 2018, although still increased versus 2016, and decrease in BMD IN Femoral neck -2.1, -6.1% versus 2018 stable versus prior. Other site without significant change compared to 2016.\par The risk of fracture is: above average BMD decreased moderately vs 2018 although stable vs 2016\par  \par Continue Prolia from [facility].\par Hypertension: Blood pressure well controlled on current medication. \par F/u in 6 mons repeat BMD next visit

## 2020-11-24 NOTE — REVIEW OF SYSTEMS
[Joint Pain] : joint pain [All other systems negative] : All other systems negative [Fatigue] : no fatigue [FreeTextEntry9] : +right hip pain while lying flat

## 2020-11-24 NOTE — HISTORY OF PRESENT ILLNESS
[Ibandronate (Boniva)] : Ibandronate [Patient taking Meds Correctly] : Patient is taking meds correctly [Prolia (Denosumab)] : Prolia (Denosumab) [FreeTextEntry1] :  No significant interval health changes.  No interval surgery, hospitalizations, fractures, or change in medications. \par \par Pt had been taking Boniva for approximately 4-1/2 years took a one year drug holiday 2014. BMD decreased on drug holiday. On Prolia since 5/2015. Tolerating well, no new fx. BMD 5/2016 improved. Prior BMD spring 2015 decreased. Pt underwent extraction of old implant ~1.5 yrs ago. Was planning on having another implant but now decided to forgo after discussing with dentist. No ONJ. \par \par After Nov 2016, had a fall (barrier causing a slip on the floor) at home with no injuries. No gait instability. Plays tennis once/week (although not currently). Exercises ~ twice a week.\par \par Has Antiphospholipid Ab syndrome and Lupus. Last DVT was ~2010. On Coumadin. Takes Plaquenil for Lupus. Stable.

## 2020-11-24 NOTE — PHYSICAL EXAM
[Alert] : alert [Healthy Appearance] : healthy appearance [No Acute Distress] : no acute distress [Normal Sclera/Conjunctiva] : normal sclera/conjunctiva [No Accessory Muscle Use] : no accessory muscle use [Clear to Auscultation] : lungs were clear to auscultation bilaterally [Normal S1, S2] : normal S1 and S2 [Normal Rate] : heart rate was normal [Regular Rhythm] : with a regular rhythm [No Edema] : no peripheral edema [Normal Bowel Sounds] : normal bowel sounds [Not Tender] : non-tender [Not Distended] : not distended [Soft] : abdomen soft [No Spinal Tenderness] : no spinal tenderness [Normal Affect] : the affect was normal [Normal Insight/Judgement] : insight and judgment were intact [Normal Mood] : the mood was normal [Thyroid Not Enlarged] : the thyroid was not enlarged [No Thyroid Nodules] : no palpable thyroid nodules

## 2020-11-24 NOTE — PROCEDURE
[FreeTextEntry1] : Bone Density 6/18/2020\par Indication: v 2018 sutdy\par spine: -2.6, -3.3% although still increased versus 2016\par Total hip  -1.7, osteopenia, no significant change\par Femoral neck -2.1, -6.1% versus 2018 stable versus prior   \par Proximal radius -1.4, osteopenia, no significant change  \par \par \par Bone mineral density: 06/18/2018 \par Indication: vs 2016 study\par Spine: -2.4, osteopenia, (+6.1%)\par Total hip: -1.6, osteopenia, (+7.7%)\par Femoral neck: -1.7, osteopenia, (+12.9%)\par Proximal radius: -1.2, osteopenia, no significant change \par \par Bone mineral density test 5/23/16\par Indication assess response to Prolia\par Spine -2.8, osteoporosis, +4.0% versus 2015\par Total hip -2.0, osteopenia, no significant change\par Femoral neck -2.4, osteopenia, no significant change\par Proximal radius -1.3, osteopenia, +3.6%\par \par \par bone mineral density test performed April 17, 2015\par Spine -3.0, osteoporosis, -3.9% versus 2013\par Total hip -2.1, osteopenia, -6.3% versus 2013\par femoral neck -2.3 osteopenia, no significant change versus 2013\par Proximal radius -1.7, osteopenia, -3.9% versus 2013

## 2021-01-30 ENCOUNTER — TRANSCRIPTION ENCOUNTER (OUTPATIENT)
Age: 66
End: 2021-01-30

## 2021-05-25 ENCOUNTER — APPOINTMENT (OUTPATIENT)
Dept: ENDOCRINOLOGY | Facility: CLINIC | Age: 66
End: 2021-05-25
Payer: COMMERCIAL

## 2021-05-25 VITALS
HEART RATE: 65 BPM | DIASTOLIC BLOOD PRESSURE: 62 MMHG | SYSTOLIC BLOOD PRESSURE: 96 MMHG | TEMPERATURE: 98 F | BODY MASS INDEX: 21.32 KG/M2 | HEIGHT: 60.3 IN | WEIGHT: 110 LBS | OXYGEN SATURATION: 98 %

## 2021-05-25 VITALS — HEART RATE: 65 BPM | OXYGEN SATURATION: 98 % | SYSTOLIC BLOOD PRESSURE: 96 MMHG | DIASTOLIC BLOOD PRESSURE: 62 MMHG

## 2021-05-25 PROCEDURE — 99214 OFFICE O/P EST MOD 30 MIN: CPT | Mod: 25

## 2021-05-25 PROCEDURE — 99072 ADDL SUPL MATRL&STAF TM PHE: CPT

## 2021-05-25 PROCEDURE — 96372 THER/PROPH/DIAG INJ SC/IM: CPT

## 2021-05-25 PROCEDURE — 77080 DXA BONE DENSITY AXIAL: CPT

## 2021-05-25 PROCEDURE — ZZZZZ: CPT

## 2021-05-25 RX ORDER — DENOSUMAB 60 MG/ML
60 INJECTION SUBCUTANEOUS
Qty: 1 | Refills: 0 | Status: COMPLETED | OUTPATIENT
Start: 2021-05-25

## 2021-05-25 RX ADMIN — DENOSUMAB 60 MG/ML: 60 INJECTION SUBCUTANEOUS at 00:00

## 2021-05-25 NOTE — PROCEDURE
[FreeTextEntry1] : Bone mineral density: 05/25/2021 \par Indication: vs. 2020 prior test showed bone loss\par Spine: (L1-L3) -2.5 osteoporosis, no significant change\par Total hip: -1.7 osteopenia, no significant change\par Femoral neck: -1.8 osteopenia, +5.2%\par Proximal radius: -1.2 osteopenia, no significant change\par \par Bone Density 6/18/2020\par Indication: v 2018 study\par spine: -2.6, -3.3% although still increased versus 2016\par Total hip  -1.7, osteopenia, no significant change\par Femoral neck -2.1, -6.1% versus 2018 stable versus prior   \par Proximal radius -1.4, osteopenia, no significant change  \par \par Bone mineral density: 06/18/2018 \par Indication: vs 2016 study\par Spine: -2.4, osteopenia, (+6.1%)\par Total hip: -1.6, osteopenia, (+7.7%)\par Femoral neck: -1.7, osteopenia, (+12.9%)\par Proximal radius: -1.2, osteopenia, no significant change \par \par Bone mineral density test 5/23/16\par Indication assess response to Prolia\par Spine -2.8, osteoporosis, +4.0% versus 2015\par Total hip -2.0, osteopenia, no significant change\par Femoral neck -2.4, osteopenia, no significant change\par Proximal radius -1.3, osteopenia, +3.6%\par \par \par bone mineral density test performed April 17, 2015\par Spine -3.0, osteoporosis, -3.9% versus 2013\par Total hip -2.1, osteopenia, -6.3% versus 2013\par femoral neck -2.3 osteopenia, no significant change versus 2013\par Proximal radius -1.7, osteopenia, -3.9% versus 2013

## 2021-05-25 NOTE — ASSESSMENT
[Denosumab Therapy] : Risks  and benefits of denosumab therapy were discussed with the patient including eczema, cellulitis, osteonecrosis of the jaw and atypical femur fractures [FreeTextEntry1] : 66 year-old female with osteoporosis. \par \par Pt had bone loss on Boniva holiday. Began Prolia in 05/2015. Tolerating well. No thigh pain. No ONJ. No interval fx. Repeat BMD 2016 improved in spine and proximal radius. BMD 2018 showed significant improvement in the spine and hip, consistent with osteopenia in all sites. Bone Density 6/18/2020 shows decrease in BMD in spine: -2.6, -3.3% v 2018, although still increased versus 2016, and decrease in BMD IN Femoral neck -2.1, -6.1% versus 2018 stable versus prior. Other site without significant change compared to 2016. The risk of fracture is: above average BMD decreased moderately vs 2018 although stable vs 2016. BMD 5/2021 indicates stable osteoporosis in spine, stable osteopenia in total hip and proximal radius, and improving osteopenia in femoral neck. BMD results reviewed w/ pt. Continue Prolia, buy and bill.\par \par Hypertension: Blood pressure well controlled on current medication. \par \par Call Dr. Naya Garcia for labs.\par \par F/u in 6 months

## 2021-05-25 NOTE — HISTORY OF PRESENT ILLNESS
[Ibandronate (Boniva)] : Ibandronate [Prolia (Denosumab)] : Prolia (Denosumab) [FreeTextEntry1] : No significant interval health changes. No interval surgery, hospitalizations, fractures, or change in medications.\par \par Pt had been taking Boniva for approximately 4-1/2 years took a one year drug holiday 2014. BMD decreased on drug holiday. On Prolia since 5/2015. Tolerating well. No thigh pain, no interval fx. Last DDS within past year. No ONJ. BMD 5/2016 improved. Prior BMD spring 2015 decreased. Pt underwent extraction of old implant ~2.5 yrs ago. Pt takes Vitamin D 2000 IU.\par \par After Nov 2016, had a fall (barrier causing a slip on the floor) at home with no injuries. No gait instability. Plays tennis once/week (although not currently). Exercises ~ twice a week.\par \par Has Antiphospholipid Ab syndrome and Lupus, managed by Dr. Blu Brooks. Last DVT was ~2010. On Coumadin. Takes Plaquenil for Lupus. Stable.

## 2021-07-26 DIAGNOSIS — R92.2 INCONCLUSIVE MAMMOGRAM: ICD-10-CM

## 2021-07-26 DIAGNOSIS — Z12.39 ENCOUNTER FOR OTHER SCREENING FOR MALIGNANT NEOPLASM OF BREAST: ICD-10-CM

## 2021-09-29 ENCOUNTER — APPOINTMENT (OUTPATIENT)
Dept: OBGYN | Facility: CLINIC | Age: 66
End: 2021-09-29
Payer: COMMERCIAL

## 2021-09-29 VITALS
DIASTOLIC BLOOD PRESSURE: 70 MMHG | HEIGHT: 61 IN | BODY MASS INDEX: 20.77 KG/M2 | SYSTOLIC BLOOD PRESSURE: 108 MMHG | WEIGHT: 110 LBS

## 2021-09-29 PROCEDURE — 99397 PER PM REEVAL EST PAT 65+ YR: CPT

## 2021-09-29 PROCEDURE — 82270 OCCULT BLOOD FECES: CPT

## 2021-09-30 LAB — HPV HIGH+LOW RISK DNA PNL CVX: NOT DETECTED

## 2021-10-05 LAB — CYTOLOGY CVX/VAG DOC THIN PREP: ABNORMAL

## 2021-11-15 ENCOUNTER — APPOINTMENT (OUTPATIENT)
Dept: OBGYN | Facility: CLINIC | Age: 66
End: 2021-11-15
Payer: COMMERCIAL

## 2021-11-15 ENCOUNTER — ASOB RESULT (OUTPATIENT)
Age: 66
End: 2021-11-15

## 2021-11-15 PROCEDURE — 76830 TRANSVAGINAL US NON-OB: CPT

## 2021-11-19 ENCOUNTER — NON-APPOINTMENT (OUTPATIENT)
Age: 66
End: 2021-11-19

## 2021-11-29 ENCOUNTER — APPOINTMENT (OUTPATIENT)
Dept: ENDOCRINOLOGY | Facility: CLINIC | Age: 66
End: 2021-11-29
Payer: COMMERCIAL

## 2021-11-29 PROCEDURE — 96401 CHEMO ANTI-NEOPL SQ/IM: CPT

## 2021-11-29 RX ORDER — DENOSUMAB 60 MG/ML
60 INJECTION SUBCUTANEOUS
Qty: 1 | Refills: 0 | Status: COMPLETED | OUTPATIENT
Start: 2021-11-29

## 2021-11-29 RX ADMIN — DENOSUMAB 60 MG/ML: 60 INJECTION SUBCUTANEOUS at 00:00

## 2022-01-13 ENCOUNTER — APPOINTMENT (OUTPATIENT)
Dept: INTERNAL MEDICINE | Facility: CLINIC | Age: 67
End: 2022-01-13

## 2022-03-25 ENCOUNTER — TRANSCRIPTION ENCOUNTER (OUTPATIENT)
Age: 67
End: 2022-03-25

## 2022-05-16 ENCOUNTER — RX RENEWAL (OUTPATIENT)
Age: 67
End: 2022-05-16

## 2022-05-31 ENCOUNTER — APPOINTMENT (OUTPATIENT)
Dept: ENDOCRINOLOGY | Facility: CLINIC | Age: 67
End: 2022-05-31
Payer: COMMERCIAL

## 2022-05-31 VITALS — WEIGHT: 109 LBS | BODY MASS INDEX: 21.12 KG/M2 | TEMPERATURE: 98.3 F | HEIGHT: 60.1 IN

## 2022-05-31 VITALS — SYSTOLIC BLOOD PRESSURE: 110 MMHG | DIASTOLIC BLOOD PRESSURE: 70 MMHG | HEART RATE: 60 BPM | OXYGEN SATURATION: 96 %

## 2022-05-31 PROCEDURE — 77080 DXA BONE DENSITY AXIAL: CPT

## 2022-05-31 PROCEDURE — 99213 OFFICE O/P EST LOW 20 MIN: CPT | Mod: 25

## 2022-05-31 PROCEDURE — ZZZZZ: CPT

## 2022-05-31 PROCEDURE — 96401 CHEMO ANTI-NEOPL SQ/IM: CPT

## 2022-05-31 RX ORDER — DENOSUMAB 60 MG/ML
60 INJECTION SUBCUTANEOUS
Qty: 1 | Refills: 0 | Status: COMPLETED | OUTPATIENT
Start: 2022-05-31

## 2022-05-31 RX ORDER — CEFDINIR 300 MG/1
300 CAPSULE ORAL
Qty: 14 | Refills: 0 | Status: COMPLETED | COMMUNITY
Start: 2022-01-14

## 2022-05-31 RX ORDER — CEPHALEXIN 500 MG/1
500 CAPSULE ORAL
Qty: 20 | Refills: 0 | Status: COMPLETED | COMMUNITY
Start: 2022-05-11

## 2022-05-31 RX ORDER — AMOXICILLIN 250 MG/1
250 CAPSULE ORAL
Qty: 21 | Refills: 0 | Status: COMPLETED | COMMUNITY
Start: 2022-04-22

## 2022-05-31 RX ORDER — ENOXAPARIN SODIUM 60 MG/.6ML
60 INJECTION, SOLUTION SUBCUTANEOUS
Qty: 6 | Refills: 0 | Status: COMPLETED | COMMUNITY
Start: 2022-01-03

## 2022-05-31 RX ORDER — PREDNISONE 5 MG/1
5 TABLET ORAL
Qty: 60 | Refills: 0 | Status: COMPLETED | COMMUNITY
Start: 2021-12-01

## 2022-05-31 RX ORDER — FLUOCINONIDE 0.5 MG/G
0.05 CREAM TOPICAL
Qty: 30 | Refills: 0 | Status: COMPLETED | COMMUNITY
Start: 2021-12-21

## 2022-05-31 RX ORDER — HYDROXYCHLOROQUINE SULFATE 200 MG/1
TABLET ORAL
Refills: 0 | Status: ACTIVE | COMMUNITY

## 2022-05-31 RX ADMIN — DENOSUMAB 60 MG/ML: 60 INJECTION SUBCUTANEOUS at 00:00

## 2022-06-01 NOTE — PROCEDURE
[FreeTextEntry1] : Bone mineral density: 05/31/2022\par indication: vs 2021 assess response to medication \par spine L1-3 -2.4 osteopenia no significant change\par total hip -1.4 osteopenia +3.8%\par femoral neck -2.2 osteopenia -6.8% although no difference versus 2020\par Trochanter -0.7 normal +7.4%\par proximal radius -1.3 osteopenia no significant change\par \par Bone mineral density: 05/25/2021 \par Indication: vs. 2020 prior test showed bone loss\par Spine: (L1-L3) -2.5 osteoporosis, no significant change\par Total hip: -1.7 osteopenia, no significant change\par Femoral neck: -1.8 osteopenia, +5.2%\par Proximal radius: -1.2 osteopenia, no significant change\par \par Bone Density 6/18/2020\par Indication: v 2018 study\par spine: -2.6, -3.3% although still increased versus 2016\par Total hip  -1.7, osteopenia, no significant change\par Femoral neck -2.1, -6.1% versus 2018 stable versus prior   \par Proximal radius -1.4, osteopenia, no significant change  \par \par Bone mineral density: 06/18/2018 \par Indication: vs 2016 study\par Spine: -2.4, osteopenia, (+6.1%)\par Total hip: -1.6, osteopenia, (+7.7%)\par Femoral neck: -1.7, osteopenia, (+12.9%)\par Proximal radius: -1.2, osteopenia, no significant change \par \par Bone mineral density test 5/23/16\par Indication assess response to Prolia\par Spine -2.8, osteoporosis, +4.0% versus 2015\par Total hip -2.0, osteopenia, no significant change\par Femoral neck -2.4, osteopenia, no significant change\par Proximal radius -1.3, osteopenia, +3.6%\par \par \par bone mineral density test performed April 17, 2015\par Spine -3.0, osteoporosis, -3.9% versus 2013\par Total hip -2.1, osteopenia, -6.3% versus 2013\par femoral neck -2.3 osteopenia, no significant change versus 2013\par Proximal radius -1.7, osteopenia, -3.9% versus 2013

## 2022-06-01 NOTE — ASSESSMENT
[Denosumab Therapy] : Risks  and benefits of denosumab therapy were discussed with the patient including eczema, cellulitis, osteonecrosis of the jaw and atypical femur fractures [FreeTextEntry1] : 67 year-old female with osteoporosis. \par \par Pt had bone loss on Boniva holiday. Began Prolia in 05/2015. Tolerating well. No thigh pain. No ONJ. No interval fx. Repeat BMD 2016 improved in spine and proximal radius. \par Bone density 2022 is generally stable.  Femoral neck shows small decline although stable versus 2020 but trochanter shows small increase.  This probably represents minor technical variability.\par . BMD results reviewed w/ pt. Continue [facility], [facility]\par \par Hypertension: Blood pressure well controlled on current medication. \par \par Call Dr. Naya Garcia for labs.\par \par F/u in 6 months

## 2022-06-01 NOTE — HISTORY OF PRESENT ILLNESS
[Ibandronate (Boniva)] : Ibandronate [Prolia (Denosumab)] : Prolia (Denosumab) [FreeTextEntry1] : No significant interval health changes. No interval surgery, hospitalizations, fractures, or change in medications.\par \par Pt had been taking Boniva for approximately 4-1/2 years took a one year drug holiday 2014. BMD decreased on drug holiday. On Prolia since 5/2015. Tolerating well. No thigh pain, no interval fx. Last DDS within past year. No ONJ. BMD 5/2016 improved. Prior BMD spring 2015 decreased.  DDS had molars upper R and L extracted few months ago. planning implants Sept 2022\par  Pt takes Vitamin D 2000 IU.\par \par After Nov 2016, had a fall (barrier causing a slip on the floor) at home with no injuries. No gait instability. Plays tennis once/week (although not currently). Exercises ~ twice a week.\par \par Has Antiphospholipid Ab syndrome and Lupus, managed by Dr. Blu Brooks. Last DVT was ~2010. On Coumadin. Takes Plaquenil for Lupus. Stable.

## 2022-11-02 ENCOUNTER — APPOINTMENT (OUTPATIENT)
Dept: OBGYN | Facility: CLINIC | Age: 67
End: 2022-11-02

## 2022-11-02 VITALS
DIASTOLIC BLOOD PRESSURE: 81 MMHG | BODY MASS INDEX: 21.6 KG/M2 | HEIGHT: 60 IN | WEIGHT: 110 LBS | SYSTOLIC BLOOD PRESSURE: 157 MMHG

## 2022-11-02 PROCEDURE — 82270 OCCULT BLOOD FECES: CPT

## 2022-11-02 PROCEDURE — 99397 PER PM REEVAL EST PAT 65+ YR: CPT

## 2022-11-03 LAB — HPV HIGH+LOW RISK DNA PNL CVX: NOT DETECTED

## 2022-11-08 LAB — CYTOLOGY CVX/VAG DOC THIN PREP: ABNORMAL

## 2022-12-05 ENCOUNTER — APPOINTMENT (OUTPATIENT)
Dept: ENDOCRINOLOGY | Facility: CLINIC | Age: 67
End: 2022-12-05

## 2022-12-05 ENCOUNTER — MED ADMIN CHARGE (OUTPATIENT)
Age: 67
End: 2022-12-05

## 2022-12-05 PROCEDURE — 96401 CHEMO ANTI-NEOPL SQ/IM: CPT

## 2022-12-05 RX ORDER — DENOSUMAB 60 MG/ML
60 INJECTION SUBCUTANEOUS
Qty: 1 | Refills: 0 | Status: COMPLETED | OUTPATIENT
Start: 2022-12-02

## 2022-12-09 ENCOUNTER — APPOINTMENT (OUTPATIENT)
Dept: INTERNAL MEDICINE | Facility: CLINIC | Age: 67
End: 2022-12-09

## 2022-12-09 VITALS
BODY MASS INDEX: 21.2 KG/M2 | WEIGHT: 108 LBS | DIASTOLIC BLOOD PRESSURE: 72 MMHG | RESPIRATION RATE: 14 BRPM | SYSTOLIC BLOOD PRESSURE: 110 MMHG | HEIGHT: 60 IN | HEART RATE: 67 BPM | OXYGEN SATURATION: 98 %

## 2022-12-09 DIAGNOSIS — R10.31 RIGHT LOWER QUADRANT PAIN: ICD-10-CM

## 2022-12-09 DIAGNOSIS — R10.9 UNSPECIFIED ABDOMINAL PAIN: ICD-10-CM

## 2022-12-09 PROCEDURE — 99213 OFFICE O/P EST LOW 20 MIN: CPT

## 2022-12-09 RX ORDER — COVID-19 MOLECULAR TEST ASSAY
KIT MISCELLANEOUS
Qty: 8 | Refills: 0 | Status: DISCONTINUED | COMMUNITY
Start: 2022-10-04

## 2022-12-09 RX ORDER — OXYCODONE 5 MG/1
5 TABLET ORAL
Qty: 4 | Refills: 0 | Status: DISCONTINUED | COMMUNITY
Start: 2022-09-07

## 2022-12-09 NOTE — PLAN
[FreeTextEntry1] : -pain has resolved \par -check cbc, cmp \par -declining imaging \par -discussed ddx could include appendicitis vs renal stone vs burst ovarian cyst \par -discussed ER precautions at length, pt agrees to go to ER if her pain returns

## 2022-12-09 NOTE — HISTORY OF PRESENT ILLNESS
[FreeTextEntry8] : 67y F with PMH of SLE, diverticulosis presenting for an acute visit for right sided lower abdominal pain that started this AM. She put a heating pad on it after which it improved. She is currently on a second course of keflex for treatment of a UTI, reports improvement of symptoms. She denies n/v/d/c, fever/chills. SHe has a hx of a small right-sided ovarian cyst. No hx of kidney stones.

## 2022-12-09 NOTE — REVIEW OF SYSTEMS
[Back Pain] : back pain [Abdominal Pain] : abdominal pain [Negative] : Gastrointestinal [FreeTextEntry8] : +right flank pain now improved

## 2022-12-12 LAB
ALBUMIN SERPL ELPH-MCNC: 4.2 G/DL
ALP BLD-CCNC: 53 U/L
ALT SERPL-CCNC: 17 U/L
ANION GAP SERPL CALC-SCNC: 12 MMOL/L
AST SERPL-CCNC: 25 U/L
BACTERIA UR CULT: NORMAL
BASOPHILS # BLD AUTO: 0.04 K/UL
BASOPHILS NFR BLD AUTO: 0.8 %
BILIRUB SERPL-MCNC: 0.3 MG/DL
BUN SERPL-MCNC: 24 MG/DL
CALCIUM SERPL-MCNC: 9.2 MG/DL
CHLORIDE SERPL-SCNC: 102 MMOL/L
CO2 SERPL-SCNC: 26 MMOL/L
CREAT SERPL-MCNC: 0.9 MG/DL
EGFR: 70 ML/MIN/1.73M2
EOSINOPHIL # BLD AUTO: 0.05 K/UL
EOSINOPHIL NFR BLD AUTO: 1 %
GLUCOSE SERPL-MCNC: 71 MG/DL
HCT VFR BLD CALC: 37.7 %
HGB BLD-MCNC: 12.8 G/DL
IMM GRANULOCYTES NFR BLD AUTO: 0.2 %
LYMPHOCYTES # BLD AUTO: 1.47 K/UL
LYMPHOCYTES NFR BLD AUTO: 30.4 %
MAN DIFF?: NORMAL
MCHC RBC-ENTMCNC: 30.9 PG
MCHC RBC-ENTMCNC: 34 GM/DL
MCV RBC AUTO: 91.1 FL
MONOCYTES # BLD AUTO: 0.33 K/UL
MONOCYTES NFR BLD AUTO: 6.8 %
NEUTROPHILS # BLD AUTO: 2.94 K/UL
NEUTROPHILS NFR BLD AUTO: 60.8 %
PLATELET # BLD AUTO: 222 K/UL
POTASSIUM SERPL-SCNC: 4.3 MMOL/L
PROT SERPL-MCNC: 6.4 G/DL
RBC # BLD: 4.14 M/UL
RBC # FLD: 13.4 %
SODIUM SERPL-SCNC: 140 MMOL/L
WBC # FLD AUTO: 4.84 K/UL

## 2023-01-26 ENCOUNTER — NON-APPOINTMENT (OUTPATIENT)
Age: 68
End: 2023-01-26

## 2023-01-30 ENCOUNTER — NON-APPOINTMENT (OUTPATIENT)
Age: 68
End: 2023-01-30

## 2023-02-03 ENCOUNTER — APPOINTMENT (OUTPATIENT)
Dept: OBGYN | Facility: CLINIC | Age: 68
End: 2023-02-03
Payer: COMMERCIAL

## 2023-02-03 ENCOUNTER — ASOB RESULT (OUTPATIENT)
Age: 68
End: 2023-02-03

## 2023-02-03 PROCEDURE — 76830 TRANSVAGINAL US NON-OB: CPT

## 2023-02-07 ENCOUNTER — NON-APPOINTMENT (OUTPATIENT)
Age: 68
End: 2023-02-07

## 2023-05-08 ENCOUNTER — RX RENEWAL (OUTPATIENT)
Age: 68
End: 2023-05-08

## 2023-06-30 ENCOUNTER — MED ADMIN CHARGE (OUTPATIENT)
Age: 68
End: 2023-06-30

## 2023-06-30 ENCOUNTER — APPOINTMENT (OUTPATIENT)
Dept: ENDOCRINOLOGY | Facility: CLINIC | Age: 68
End: 2023-06-30
Payer: COMMERCIAL

## 2023-06-30 VITALS
DIASTOLIC BLOOD PRESSURE: 72 MMHG | HEIGHT: 60 IN | SYSTOLIC BLOOD PRESSURE: 110 MMHG | BODY MASS INDEX: 21.6 KG/M2 | HEART RATE: 56 BPM | OXYGEN SATURATION: 97 % | WEIGHT: 110 LBS

## 2023-06-30 PROCEDURE — 99213 OFFICE O/P EST LOW 20 MIN: CPT | Mod: 25

## 2023-06-30 PROCEDURE — 96401 CHEMO ANTI-NEOPL SQ/IM: CPT

## 2023-06-30 RX ORDER — DENOSUMAB 60 MG/ML
60 INJECTION SUBCUTANEOUS
Qty: 1 | Refills: 0 | Status: COMPLETED | OUTPATIENT
Start: 2023-06-30

## 2023-06-30 RX ADMIN — DENOSUMAB 60 MG/ML: 60 INJECTION SUBCUTANEOUS at 00:00

## 2023-07-01 NOTE — HISTORY OF PRESENT ILLNESS
[Ibandronate (Boniva)] : Ibandronate [Prolia (Denosumab)] : Prolia (Denosumab) [FreeTextEntry1] : Had dental implants in January 2023-no dental issues or complications since. No other dental work being planned. Since last visit, was started on Klonopin and Mirtazapine and is now able to sleep well. Has also started playing pickleball and had one injury where her racket hit her face and split her lip. No significant interval health changes. No interval surgery, hospitalizations, or fractures.\par \par Pt had been taking Boniva for approximately 4-1/2 years took a one year drug holiday 2014. BMD decreased on drug holiday. On Prolia since 5/2015. Tolerating well. No thigh pain, no interval fx. Last DDS within past year. No ONJ. BMD 5/2016 improved. Prior BMD spring 2015 decreased. \par  Pt takes Vitamin D 2000 IU.\par \par After Nov 2016, had a fall (barrier causing a slip on the floor) at home with no injuries. No gait instability. Plays tennis once/week (although not currently). Exercises ~ twice a week.\par \par Has Antiphospholipid Ab syndrome and Lupus, managed by Dr. Blu Brooks. Last DVT was ~2010. On Coumadin. Takes Plaquenil for Lupus. Stable.

## 2023-07-01 NOTE — ASSESSMENT
[Denosumab Therapy] : Risks  and benefits of denosumab therapy were discussed with the patient including eczema, cellulitis, osteonecrosis of the jaw and atypical femur fractures [FreeTextEntry1] : 68  year-old female with osteoporosis. \par \par Pt had bone loss on Boniva holiday. Began Prolia in 05/2015. Tolerating well. No thigh pain. No ONJ. No interval fx. Repeat BMD 2016 improved in spine and proximal radius. \par Bone density 2022 is generally stable.  Femoral neck shows small decline although stable versus 2020 but trochanter shows small increase.  This probably represents minor technical variability.\par last . BMD results reviewed w/ pt. Continue Prolia, [facility]\par Call Bill Givens for labs\par F/u in 6 months\par Patient to notify if any other dental implants are needed\par Repeat BMD one year spring 2024

## 2023-09-22 ENCOUNTER — NON-APPOINTMENT (OUTPATIENT)
Age: 68
End: 2023-09-22

## 2023-12-08 DIAGNOSIS — Z12.39 ENCOUNTER FOR OTHER SCREENING FOR MALIGNANT NEOPLASM OF BREAST: ICD-10-CM

## 2024-01-04 ENCOUNTER — NON-APPOINTMENT (OUTPATIENT)
Age: 69
End: 2024-01-04

## 2024-01-07 ENCOUNTER — NON-APPOINTMENT (OUTPATIENT)
Age: 69
End: 2024-01-07

## 2024-01-09 ENCOUNTER — APPOINTMENT (OUTPATIENT)
Dept: INTERNAL MEDICINE | Facility: CLINIC | Age: 69
End: 2024-01-09
Payer: COMMERCIAL

## 2024-01-09 VITALS
OXYGEN SATURATION: 95 % | SYSTOLIC BLOOD PRESSURE: 112 MMHG | DIASTOLIC BLOOD PRESSURE: 70 MMHG | HEIGHT: 60 IN | WEIGHT: 111 LBS | RESPIRATION RATE: 14 BRPM | BODY MASS INDEX: 21.79 KG/M2 | TEMPERATURE: 99.2 F | HEART RATE: 71 BPM

## 2024-01-09 DIAGNOSIS — H10.33 UNSPECIFIED ACUTE CONJUNCTIVITIS, BILATERAL: ICD-10-CM

## 2024-01-09 DIAGNOSIS — J01.80 OTHER ACUTE SINUSITIS: ICD-10-CM

## 2024-01-09 PROCEDURE — 99214 OFFICE O/P EST MOD 30 MIN: CPT

## 2024-01-10 ENCOUNTER — APPOINTMENT (OUTPATIENT)
Dept: ENDOCRINOLOGY | Facility: CLINIC | Age: 69
End: 2024-01-10
Payer: COMMERCIAL

## 2024-01-10 ENCOUNTER — APPOINTMENT (OUTPATIENT)
Dept: ENDOCRINOLOGY | Facility: CLINIC | Age: 69
End: 2024-01-10

## 2024-01-10 VITALS
HEIGHT: 60 IN | DIASTOLIC BLOOD PRESSURE: 72 MMHG | HEART RATE: 75 BPM | OXYGEN SATURATION: 95 % | BODY MASS INDEX: 21.79 KG/M2 | SYSTOLIC BLOOD PRESSURE: 100 MMHG | WEIGHT: 111 LBS

## 2024-01-10 DIAGNOSIS — M81.0 AGE-RELATED OSTEOPOROSIS W/OUT CURRENT PATHOLOGICAL FRACTURE: ICD-10-CM

## 2024-01-10 PROCEDURE — 99213 OFFICE O/P EST LOW 20 MIN: CPT | Mod: 25

## 2024-01-10 PROCEDURE — 77080 DXA BONE DENSITY AXIAL: CPT

## 2024-01-10 PROCEDURE — 96401 CHEMO ANTI-NEOPL SQ/IM: CPT

## 2024-01-10 RX ORDER — DENOSUMAB 60 MG/ML
60 INJECTION SUBCUTANEOUS
Qty: 1 | Refills: 0 | Status: COMPLETED | OUTPATIENT
Start: 2024-01-10

## 2024-01-10 RX ADMIN — DENOSUMAB 60 MG/ML: 60 INJECTION SUBCUTANEOUS at 00:00

## 2024-01-10 NOTE — ASSESSMENT
[Denosumab Therapy] : Risks  and benefits of denosumab therapy were discussed with the patient including eczema, cellulitis, osteonecrosis of the jaw and atypical femur fractures [FreeTextEntry1] : 68 year-old female with osteoporosis.  Pt had bone loss on Boniva holiday. Began Prolia in 05/2015. Tolerating well. No thigh pain. No ONJ. No interval fx. Repeat BMD 2016 improved in spine and proximal radius.  Bone density 2022 is generally stable. Femoral neck shows small decline although stable versus 2020 but trochanter shows small increase. This probably represents minor technical variability. BMD 1/2024 shows small decrease in the spine vs variability but fem neck trending better although stable. BMD results reviewed w/ pt. Continue Prolia, [facility]  F/u in 6 months for Prolia.

## 2024-01-10 NOTE — PROCEDURE
[FreeTextEntry1] : Bone Mineral Density: 01/10/2024  Indication: vs 2022 Spine (L1, L2, L3): -2.7, osteoporosis, (-4.7%) Total hip: -1.6, osteopenia, no significant change Femoral neck: -2.1, osteopenia, no significant change Proximal radius: -1.5, osteopenia, no significant change  Bone mineral density: 05/31/2022 indication: vs 2021 assess response to medication  spine L1-3 -2.4 osteopenia no significant change total hip -1.4 osteopenia +3.8% femoral neck -2.2 osteopenia -6.8% although no difference versus 2020 Trochanter -0.7 normal +7.4% proximal radius -1.3 osteopenia no significant change  Bone mineral density: 05/25/2021  Indication: vs. 2020 prior test showed bone loss Spine: (L1-L3) -2.5 osteoporosis, no significant change Total hip: -1.7 osteopenia, no significant change Femoral neck: -1.8 osteopenia, +5.2% Proximal radius: -1.2 osteopenia, no significant change  Bone Density 6/18/2020 Indication: v 2018 study spine: -2.6, -3.3% although still increased versus 2016 Total hip  -1.7, osteopenia, no significant change Femoral neck -2.1, -6.1% versus 2018 stable versus prior    Proximal radius -1.4, osteopenia, no significant change    Bone mineral density: 06/18/2018  Indication: vs 2016 study Spine: -2.4, osteopenia, (+6.1%) Total hip: -1.6, osteopenia, (+7.7%) Femoral neck: -1.7, osteopenia, (+12.9%) Proximal radius: -1.2, osteopenia, no significant change   Bone mineral density test 5/23/16 Indication assess response to Prolia Spine -2.8, osteoporosis, +4.0% versus 2015 Total hip -2.0, osteopenia, no significant change Femoral neck -2.4, osteopenia, no significant change Proximal radius -1.3, osteopenia, +3.6%   bone mineral density test performed April 17, 2015 Spine -3.0, osteoporosis, -3.9% versus 2013 Total hip -2.1, osteopenia, -6.3% versus 2013 femoral neck -2.3 osteopenia, no significant change versus 2013 Proximal radius -1.7, osteopenia, -3.9% versus 2013

## 2024-01-10 NOTE — PHYSICAL EXAM
[Alert] : alert [Well Nourished] : well nourished [No Acute Distress] : no acute distress [Well Developed] : well developed [Normal Sclera/Conjunctiva] : normal sclera/conjunctiva [EOMI] : extra ocular movement intact [No Proptosis] : no proptosis [Thyroid Not Enlarged] : the thyroid was not enlarged [No Thyroid Nodules] : no palpable thyroid nodules [Clear to Auscultation] : lungs were clear to auscultation bilaterally [Normal S1, S2] : normal S1 and S2 [Normal Rate] : heart rate was normal [Regular Rhythm] : with a regular rhythm [No Edema] : no peripheral edema [Not Tender] : non-tender [Not Distended] : not distended [Soft] : abdomen soft [Normal Anterior Cervical Nodes] : no anterior cervical lymphadenopathy [No Spinal Tenderness] : no spinal tenderness [Spine Straight] : spine straight [No Stigmata of Cushings Syndrome] : no stigmata of Cushings Syndrome [Normal Gait] : normal gait [No Tremors] : no tremors [Oriented x3] : oriented to person, place, and time [Normal Lips/Gums] : the lips and gums were normal [Normal Oropharynx] : the oropharynx was normal [No Respiratory Distress] : no respiratory distress

## 2024-01-10 NOTE — HISTORY OF PRESENT ILLNESS
[Ibandronate (Boniva)] : Ibandronate [Prolia (Denosumab)] : Prolia (Denosumab) [FreeTextEntry1] : No significant interval health changes. No interval surgery, hospitalizations, or fractures. Labs Dec 2023 reviewed.  Pt had been taking Boniva for approximately 4-1/2 years took a one year drug holiday 2014. BMD decreased on drug holiday. On Prolia since 5/2015. Tolerating well. No thigh pain, no interval fx. Last DDS within past year. Had dental implants in January 2023. No major dental work planned. No ONJ. Pt takes Vitamin D 2000 IU.  After Nov 2016, had a fall (barrier causing a slip on the floor) at home with no injuries. No gait instability. Plays tennis once/week (although not currently). Exercises ~ twice a week.  Has Antiphospholipid Ab syndrome and Lupus, managed by Dr. Blu Brooks. Last DVT was ~2010. On Coumadin. Takes Plaquenil for Lupus. Stable.   Started on Klonopin and Mirtazapine (2023) and is now able to sleep well. Has also started playing pickleball (2023).

## 2024-01-12 ENCOUNTER — NON-APPOINTMENT (OUTPATIENT)
Age: 69
End: 2024-01-12

## 2024-01-22 ENCOUNTER — APPOINTMENT (OUTPATIENT)
Dept: INTERNAL MEDICINE | Facility: CLINIC | Age: 69
End: 2024-01-22
Payer: COMMERCIAL

## 2024-01-22 VITALS
WEIGHT: 110 LBS | HEIGHT: 60 IN | TEMPERATURE: 97.6 F | HEART RATE: 56 BPM | SYSTOLIC BLOOD PRESSURE: 110 MMHG | OXYGEN SATURATION: 97 % | DIASTOLIC BLOOD PRESSURE: 68 MMHG | RESPIRATION RATE: 14 BRPM | BODY MASS INDEX: 21.6 KG/M2

## 2024-01-22 DIAGNOSIS — Z00.00 ENCOUNTER FOR GENERAL ADULT MEDICAL EXAMINATION W/OUT ABNORMAL FINDINGS: ICD-10-CM

## 2024-01-22 PROCEDURE — 99397 PER PM REEVAL EST PAT 65+ YR: CPT

## 2024-01-22 RX ORDER — AMOXICILLIN 500 MG/1
500 CAPSULE ORAL
Qty: 24 | Refills: 0 | Status: DISCONTINUED | COMMUNITY
Start: 2022-07-28 | End: 2024-01-22

## 2024-01-22 RX ORDER — CLONAZEPAM 0.5 MG/1
0.5 TABLET ORAL
Refills: 0 | Status: ACTIVE | COMMUNITY

## 2024-01-22 RX ORDER — CIPROFLOXACIN 3 MG/ML
0.3 SOLUTION OPHTHALMIC 4 TIMES DAILY
Qty: 1 | Refills: 0 | Status: DISCONTINUED | COMMUNITY
Start: 2024-01-09 | End: 2024-01-22

## 2024-01-22 RX ORDER — AMITRIPTYLINE HCL 10 MG
10 TABLET ORAL
Refills: 0 | Status: ACTIVE | COMMUNITY

## 2024-01-22 RX ORDER — LORAZEPAM 0.5 MG/1
0.5 TABLET ORAL
Refills: 0 | Status: DISCONTINUED | COMMUNITY
End: 2024-01-22

## 2024-01-22 NOTE — PLAN
[FreeTextEntry1] : #HCM - check labs - scheduled for mammo end of the month  - scheduled for colonoscopy in September - Does not receive Shingles or PNA vaccine due to SLE as recommended by her Rheumatologist - Up to date with flu shot - Up to date with DEXA  #UTI - Continue Cefdinir - Check UA and urine culture tho s/p 1 dose of abx so may not be accurate  - Follows with Uro for recurrent UTIs  #HTN - BP stable - Continue Enalapril and Metoprolol - Follows with Cardio  #SLE - Continue Plaquenil - Follows with Rheum   #Antiphospholipid Syndrome - Continue Coumadin - Follows with Heme/Onc  #Osteoporosis - Continue Prolia injections  - UTD with DEXA

## 2024-01-22 NOTE — HISTORY OF PRESENT ILLNESS
[FreeTextEntry1] : CPE [de-identified] : 67y F with PMH of SLE, Antiphospholipid Syndrome (on Coumadin), HTN, Osteoporosis, recurrent UTIs presents for CPE. Pt reports she woke up this AM with a UTI. She admits to increased frequency and dysuria. She started taking Cefdinir this AM which she is prescribed by her Urologist Dr. Snow for frequent UTIs. Pt follows with Dr. Brooks (Rheumatology) for SLE and Dr. Garcia (Heme/onc) for Antiphospholipid Syndrome. She denies any other complaints today, admits she feels well.

## 2024-01-22 NOTE — PHYSICAL EXAM
[No Acute Distress] : no acute distress [Well Nourished] : well nourished [Well Developed] : well developed [Well-Appearing] : well-appearing [Normal Sclera/Conjunctiva] : normal sclera/conjunctiva [PERRL] : pupils equal round and reactive to light [Normal Outer Ear/Nose] : the outer ears and nose were normal in appearance [EOMI] : extraocular movements intact [Normal Oropharynx] : the oropharynx was normal [No Lymphadenopathy] : no lymphadenopathy [Thyroid Normal, No Nodules] : the thyroid was normal and there were no nodules present [Supple] : supple [No Respiratory Distress] : no respiratory distress  [No Accessory Muscle Use] : no accessory muscle use [Clear to Auscultation] : lungs were clear to auscultation bilaterally [Normal Rate] : normal rate  [Regular Rhythm] : with a regular rhythm [Normal S1, S2] : normal S1 and S2 [No Murmur] : no murmur heard [No Varicosities] : no varicosities [No Edema] : there was no peripheral edema [Soft] : abdomen soft [Non Tender] : non-tender [Non-distended] : non-distended [No Masses] : no abdominal mass palpated [Normal Posterior Cervical Nodes] : no posterior cervical lymphadenopathy [Normal Anterior Cervical Nodes] : no anterior cervical lymphadenopathy [No CVA Tenderness] : no CVA  tenderness [No Spinal Tenderness] : no spinal tenderness [Grossly Normal Strength/Tone] : grossly normal strength/tone [No Rash] : no rash [No Focal Deficits] : no focal deficits [Normal Affect] : the affect was normal [Normal Insight/Judgement] : insight and judgment were intact

## 2024-01-22 NOTE — HEALTH RISK ASSESSMENT
[Good] : ~his/her~  mood as  good [Monthly or less (1 pt)] : Monthly or less (1 point) [Never (0 pts)] : Never (0 points) [No] : In the past 12 months have you used drugs other than those required for medical reasons? No [No falls in past year] : Patient reported no falls in the past year [0] : 2) Feeling down, depressed, or hopeless: Not at all (0) [PHQ-2 Negative - No further assessment needed] : PHQ-2 Negative - No further assessment needed [None] : None [With Significant Other] : lives with significant other [Retired] : retired [Fully functional (bathing, dressing, toileting, transferring, walking, feeding)] : Fully functional (bathing, dressing, toileting, transferring, walking, feeding) [] :  [Fully functional (using the telephone, shopping, preparing meals, housekeeping, doing laundry, using] : Fully functional and needs no help or supervision to perform IADLs (using the telephone, shopping, preparing meals, housekeeping, doing laundry, using transportation, managing medications and managing finances) [Patient/Caregiver not ready to engage] : , patient/caregiver not ready to engage [Never] : Never [Audit-CScore] : 1 [de-identified] : Active [de-identified] : Balanced [NSP1Drway] : 0 [Change in mental status noted] : No change in mental status noted [Language] : denies difficulty with language [Behavior] : denies difficulty with behavior [Learning/Retaining New Information] : denies difficulty learning/retaining new information [Handling Complex Tasks] : denies difficulty handling complex tasks [Reasoning] : denies difficulty with reasoning [Spatial Ability and Orientation] : denies difficulty with spatial ability and orientation [Reports changes in hearing] : Reports no changes in hearing [Reports changes in vision] : Reports no changes in vision [Reports normal functional visual acuity (ie: able to read med bottle)] : Reports poor functional visual acuity.  [Reports changes in dental health] : Reports no changes in dental health

## 2024-01-23 LAB
25(OH)D3 SERPL-MCNC: 64.7 NG/ML
ALBUMIN SERPL ELPH-MCNC: 4.1 G/DL
ALP BLD-CCNC: 109 U/L
ALT SERPL-CCNC: 41 U/L
ANION GAP SERPL CALC-SCNC: 11 MMOL/L
APPEARANCE: CLEAR
AST SERPL-CCNC: 33 U/L
BACTERIA: NEGATIVE /HPF
BASOPHILS # BLD AUTO: 0.03 K/UL
BASOPHILS NFR BLD AUTO: 0.6 %
BILIRUB SERPL-MCNC: 0.3 MG/DL
BILIRUBIN URINE: NEGATIVE
BLOOD URINE: ABNORMAL
BUN SERPL-MCNC: 23 MG/DL
CALCIUM SERPL-MCNC: 8.9 MG/DL
CAST: NORMAL /LPF
CHLORIDE SERPL-SCNC: 104 MMOL/L
CHOLEST SERPL-MCNC: 189 MG/DL
CO2 SERPL-SCNC: 24 MMOL/L
COLOR: YELLOW
CREAT SERPL-MCNC: 0.75 MG/DL
EGFR: 86 ML/MIN/1.73M2
EOSINOPHIL # BLD AUTO: 0.07 K/UL
EOSINOPHIL NFR BLD AUTO: 1.3 %
EPITHELIAL CELLS: 0 /HPF
ESTIMATED AVERAGE GLUCOSE: 105 MG/DL
GLUCOSE QUALITATIVE U: NEGATIVE MG/DL
GLUCOSE SERPL-MCNC: 78 MG/DL
HBA1C MFR BLD HPLC: 5.3 %
HCT VFR BLD CALC: 37.7 %
HDLC SERPL-MCNC: 57 MG/DL
HGB BLD-MCNC: 12.4 G/DL
IMM GRANULOCYTES NFR BLD AUTO: 0.4 %
KETONES URINE: NEGATIVE MG/DL
LDLC SERPL CALC-MCNC: 114 MG/DL
LEUKOCYTE ESTERASE URINE: ABNORMAL
LYMPHOCYTES # BLD AUTO: 1.51 K/UL
LYMPHOCYTES NFR BLD AUTO: 27.7 %
MAN DIFF?: NORMAL
MCHC RBC-ENTMCNC: 30.1 PG
MCHC RBC-ENTMCNC: 32.9 GM/DL
MCV RBC AUTO: 91.5 FL
MICROSCOPIC-UA: NORMAL
MONOCYTES # BLD AUTO: 0.28 K/UL
MONOCYTES NFR BLD AUTO: 5.1 %
NEUTROPHILS # BLD AUTO: 3.54 K/UL
NEUTROPHILS NFR BLD AUTO: 64.9 %
NITRITE URINE: NEGATIVE
NONHDLC SERPL-MCNC: 131 MG/DL
PH URINE: 6
PLATELET # BLD AUTO: 259 K/UL
POTASSIUM SERPL-SCNC: 4.4 MMOL/L
PROT SERPL-MCNC: 6.5 G/DL
PROTEIN URINE: NEGATIVE MG/DL
RBC # BLD: 4.12 M/UL
RBC # FLD: 14.1 %
RED BLOOD CELLS URINE: 0 /HPF
SODIUM SERPL-SCNC: 139 MMOL/L
SPECIFIC GRAVITY URINE: 1.01
TRIGL SERPL-MCNC: 96 MG/DL
TSH SERPL-ACNC: 1.1 UIU/ML
UROBILINOGEN URINE: 0.2 MG/DL
WBC # FLD AUTO: 5.45 K/UL
WHITE BLOOD CELLS URINE: 133 /HPF

## 2024-01-25 LAB — BACTERIA UR CULT: NORMAL

## 2024-01-29 ENCOUNTER — EMERGENCY (EMERGENCY)
Facility: HOSPITAL | Age: 69
LOS: 1 days | Discharge: HOME | End: 2024-01-29
Attending: STUDENT IN AN ORGANIZED HEALTH CARE EDUCATION/TRAINING PROGRAM | Admitting: STUDENT IN AN ORGANIZED HEALTH CARE EDUCATION/TRAINING PROGRAM
Payer: COMMERCIAL

## 2024-01-29 VITALS
HEIGHT: 60 IN | WEIGHT: 110.01 LBS | SYSTOLIC BLOOD PRESSURE: 124 MMHG | RESPIRATION RATE: 18 BRPM | HEART RATE: 64 BPM | OXYGEN SATURATION: 99 % | TEMPERATURE: 98 F | DIASTOLIC BLOOD PRESSURE: 84 MMHG

## 2024-01-29 VITALS
DIASTOLIC BLOOD PRESSURE: 74 MMHG | RESPIRATION RATE: 19 BRPM | OXYGEN SATURATION: 97 % | TEMPERATURE: 98 F | SYSTOLIC BLOOD PRESSURE: 122 MMHG | HEART RATE: 71 BPM

## 2024-01-29 PROCEDURE — 99284 EMERGENCY DEPT VISIT MOD MDM: CPT | Mod: 25

## 2024-01-29 PROCEDURE — 72125 CT NECK SPINE W/O DYE: CPT | Mod: 26,MA

## 2024-01-29 PROCEDURE — 72125 CT NECK SPINE W/O DYE: CPT | Mod: MA

## 2024-01-29 PROCEDURE — 99284 EMERGENCY DEPT VISIT MOD MDM: CPT

## 2024-01-29 PROCEDURE — 70450 CT HEAD/BRAIN W/O DYE: CPT | Mod: 26,MA

## 2024-01-29 PROCEDURE — 70450 CT HEAD/BRAIN W/O DYE: CPT | Mod: MA

## 2024-01-29 NOTE — ED PROVIDER NOTE - PATIENT PORTAL LINK FT
You can access the FollowMyHealth Patient Portal offered by [facility] by registering at the following website: [email or web address]. By joining [facility]’s FollowMyHealth portal, you will also be able to view your health information using other applications (apps) compatible with our system.

## 2024-01-29 NOTE — ED PROVIDER NOTE - OBJECTIVE STATEMENT
68 yo female with PMHx of Antiphospholipid antibody syndrome, DVT (deep venous thrombosis),  and Systemic lupus presenting to ED with complaints of head injury after a mechanical fall today. Patient was playing pickle ball and tripped 70 yo female with PMHx of Antiphospholipid antibody syndrome, DVT (deep venous thrombosis),  and Systemic lupus presenting to ED with complaints of head injury after a mechanical fall today. Patient was playing pickle ball and tripped and hit her head on the court. Endorses dizziness and HA.  Denies LOC- patient is on Coumadin and was concerned for bleeding.

## 2024-01-29 NOTE — ED PROVIDER NOTE - NSICDXPASTMEDICALHX_GEN_ALL_CORE_FT
PAST MEDICAL HISTORY:  Antiphospholipid antibody syndrome     DVT (deep venous thrombosis)     Systemic lupus

## 2024-01-29 NOTE — ED PROVIDER NOTE - PHYSICAL EXAMINATION
CN II-XII intact, grossly non-focal, speaking in full clear sentences.     Scalp- small abrasion not actively bleeding    Cervical spine- midline, no point tenderness or step offs, full ROM

## 2024-01-29 NOTE — ED ADULT NURSE NOTE - OBJECTIVE STATEMENT
Pt. received alert and oriented x3 with chief complaint of slip and fall hitting back of head while playing pickle ball. Pt. presents w/ abrasion and swelling to back of head and denies LOC.

## 2024-01-29 NOTE — ED ADULT TRIAGE NOTE - CHIEF COMPLAINT QUOTE
Pt ambulatory to triage c/o s/p fall while playing pickle ball. Pt states she fell back on her head and denies LOC. Pt states she feels dizzy when she stands, has a headache and take coumadin at home. +PERRL, moving all extremities equally, speaking in clear sentences, no facial droop/arm drift noted.

## 2024-01-29 NOTE — ED PROVIDER NOTE - ATTENDING APP SHARED VISIT CONTRIBUTION OF CARE
I, Regan Valenzuela MD, have seen and examined the patient on the date of service.  I agree with the LISANDRA's assessment as written, with exceptions or additions as noted below or in a separate note.  Patient with history of antiphospholipid syndrome on Coumadin is presenting after a fall.  States while at pickleball she tripped and fell backward.  Hit the back of her head.  Did not have loss of consciousness.  Endorsing mild headache at this time.  No nausea or vomiting.  On exam she has tenderness to the back of her head with no overlying wound.  No tenderness to palpation over the spine.  No other evidence of trauma.  Given on anticoagulation, will check CT head and C-spine.  Found to be negative, can be discharged.  No other evidence of traumatic injury warranting additional workup at this time.  Fall was not syncopal.

## 2024-01-29 NOTE — ED ADULT NURSE NOTE - NSFALLRISKINTERV_ED_ALL_ED

## 2024-02-28 ENCOUNTER — APPOINTMENT (OUTPATIENT)
Dept: OBGYN | Facility: CLINIC | Age: 69
End: 2024-02-28
Payer: COMMERCIAL

## 2024-02-28 VITALS
HEIGHT: 60 IN | BODY MASS INDEX: 21.6 KG/M2 | DIASTOLIC BLOOD PRESSURE: 76 MMHG | SYSTOLIC BLOOD PRESSURE: 137 MMHG | WEIGHT: 110 LBS

## 2024-02-28 DIAGNOSIS — Z01.419 ENCOUNTER FOR GYNECOLOGICAL EXAMINATION (GENERAL) (ROUTINE) W/OUT ABNORMAL FINDINGS: ICD-10-CM

## 2024-02-28 PROCEDURE — 99397 PER PM REEVAL EST PAT 65+ YR: CPT

## 2024-02-28 PROCEDURE — 82270 OCCULT BLOOD FECES: CPT

## 2024-02-28 NOTE — HISTORY OF PRESENT ILLNESS
[FreeTextEntry1] : 2024. SIENA SERNA 69 year old female  LMP PM presents for annual visit.  She feels well and offers no complaints. She denies abn discharge or vaginitis sxs. No urinary complaints. She has normal BM, no bloody stool. She denies abdominal or pelvic pain. Pt reports she gets UTI's often.   GynHx:  PMH: hysteroscopy polyp removal, fibroids, ovarian cyst, osteoporosis, APLS, Lupus SHx: denies  FHx: Maternal Mother and uncle had colon ca. Denies FHx of breast, ovarian or uterine cancer.  Meds: Prolia-, see list All: Sulfur     [TextBox_4] : pelvic sono (2023): left cyst unchanged  [Mammogramdate] : 2/2024 [PapSmeardate] : 11/2022 [BreastSonogramDate] : 2/2024 [BoneDensityDate] : 1/2024 [TextBox_37] : osteoporosis  [ColonoscopyDate] : 11/2019

## 2024-02-28 NOTE — PLAN
[FreeTextEntry1] : 69 year old female pt presents for routine gyn exam:  Osteoporosis: D/w pt increased calcium intake in diet and Vit D 1000 U intake, & weight-bearing exercises (i.e. walking) for 30 min daily Advised pt to continue seeing an endocrinologist to manage osteoporosis   Breast and pelvic exam performed Pap/HPV conducted Pt to schedule pelvic sono  Pt to schedule colonoscopy  RTO in 1 year for annual or PRN

## 2024-02-28 NOTE — PHYSICAL EXAM
[Appropriately responsive] : appropriately responsive [Chaperone Present] : A chaperone was present in the examining room during all aspects of the physical examination [Alert] : alert [No Acute Distress] : no acute distress [No Lymphadenopathy] : no lymphadenopathy [Regular Rate Rhythm] : regular rate rhythm [No Murmurs] : no murmurs [Clear to Auscultation B/L] : clear to auscultation bilaterally [Soft] : soft [Non-tender] : non-tender [Non-distended] : non-distended [No HSM] : No HSM [No Lesions] : no lesions [No Mass] : no mass [Oriented x3] : oriented x3 [Examination Of The Breasts] : a normal appearance [No Masses] : no breast masses were palpable [Vulvar Atrophy] : vulvar atrophy [Labia Majora] : normal [Labia Minora] : normal [Atrophy] : atrophy [Normal] : normal [Uterine Adnexae] : normal [FreeTextEntry1] : skinny spec [FreeTextEntry9] : Guaiac test negative, no masses noted

## 2024-02-29 LAB — HPV HIGH+LOW RISK DNA PNL CVX: NOT DETECTED

## 2024-03-04 LAB — CYTOLOGY CVX/VAG DOC THIN PREP: ABNORMAL

## 2024-04-03 NOTE — PHYSICAL EXAM
Records Requested     April 3, 2024 9:46 AM   [facility]   Facility  [facility]    Outcome 9:45 am Sent request for imaging to be pushed to PACS. -NATE Guardado on 4/24/2024 at 10:31 AM Imaging resolved into PACS. -NATE     RECORDS RECEIVED FROM: Care Everywhere   REASON FOR VISIT: Intractable chronic migraine with aura and without status migrainosus, Evaluate for occipital nerve block vs botox for chronic migraine   PROVIDER: Sharonda Overton MD   DATE OF APPT: 5/29/24 @ 1:00 pm    NOTES (FOR ALL VISITS) STATUS DETAILS   OFFICE NOTE from referring provider Care Everywhere Hosp Referral    OFFICE NOTE from other specialist Care Everywhere 3/6/24 Maribel, Juarez Bingham PA-C @[facility]    3/23/23 Nataly Jacobo APRN DNP  @[facility]    3/16/23, 9/7/22 Dione Myers NP  @[email or web address]    12/9/22 Alicia Goldsmith MD  @[facility]     DISCHARGE SUMMARY from hospital Care Everywhere 2/11/24-2/27/24 Christoph Krishnamurthy MD @[facility]    6/17/23-6/22/23 Birdie Garcia MD  @[facility]    8/22/22-9/2/22 Elroy Olea, DO  @[facility]    8/15/22-8/22/22 Indio Morales, DO  @[facility]    7/10/22-7/20/22 Cuauhtemoc Sweet, DO  @[facility]     DISCHARGE REPORT from the ER Care Everywhere 9/7/23-9/8/23 Tina Joseph MD  @[facility] ED    7/27/23 Jany Us, DO  @[facility]    6/28/23 Irene Bhakta DO  @[facility]     MEDICATION LIST Internal    IMAGING  (FOR ALL VISITS)     MRI (HEAD, NECK, SPINE) PACS [facility]  2/24/24 MR Brain    [facility]  7/27/23 MR Brain  6/17/23 MR Soft Tissue Neck  8/15/22 MR Brain  7/7/22 MR Head Brain     CT (HEAD, NECK, SPINE) PACS [facility]  9/8/23 CT Head  6/28/23 CT Head  6/17/23 CT Head  8/21/22 CT Head  8/18/22 CT Head  8/15/22 CTA Head Neck Carotid Stroke Protocol         [Well Nourished] : well nourished [No Acute Distress] : no acute distress [Well-Appearing] : well-appearing [Normal Sclera/Conjunctiva] : normal sclera/conjunctiva [Well Developed] : well developed [PERRL] : pupils equal round and reactive to light [Normal Outer Ear/Nose] : the outer ears and nose were normal in appearance [EOMI] : extraocular movements intact [No JVD] : no jugular venous distention [Normal Oropharynx] : the oropharynx was normal [Supple] : supple [No Lymphadenopathy] : no lymphadenopathy [No Respiratory Distress] : no respiratory distress  [Thyroid Normal, No Nodules] : the thyroid was normal and there were no nodules present [No Accessory Muscle Use] : no accessory muscle use [Clear to Auscultation] : lungs were clear to auscultation bilaterally [Normal Rate] : normal rate  [Normal S1, S2] : normal S1 and S2 [Regular Rhythm] : with a regular rhythm [No Murmur] : no murmur heard [No Carotid Bruits] : no carotid bruits [No Abdominal Bruit] : a ~M bruit was not heard ~T in the abdomen [No Varicosities] : no varicosities [No Edema] : there was no peripheral edema [Pedal Pulses Present] : the pedal pulses are present [No Palpable Aorta] : no palpable aorta [No Extremity Clubbing/Cyanosis] : no extremity clubbing/cyanosis [Soft] : abdomen soft [Non Tender] : non-tender [Non-distended] : non-distended [No HSM] : no HSM [No Masses] : no abdominal mass palpated [Normal Bowel Sounds] : normal bowel sounds [Normal Posterior Cervical Nodes] : no posterior cervical lymphadenopathy [Normal Anterior Cervical Nodes] : no anterior cervical lymphadenopathy [No CVA Tenderness] : no CVA  tenderness [No Spinal Tenderness] : no spinal tenderness [No Joint Swelling] : no joint swelling [Grossly Normal Strength/Tone] : grossly normal strength/tone [No Focal Deficits] : no focal deficits [No Rash] : no rash [Coordination Grossly Intact] : coordination grossly intact [Normal Gait] : normal gait [Deep Tendon Reflexes (DTR)] : deep tendon reflexes were 2+ and symmetric [Normal Affect] : the affect was normal [Normal Insight/Judgement] : insight and judgment were intact

## 2024-05-06 ENCOUNTER — ASOB RESULT (OUTPATIENT)
Age: 69
End: 2024-05-06

## 2024-05-06 ENCOUNTER — APPOINTMENT (OUTPATIENT)
Dept: OBGYN | Facility: CLINIC | Age: 69
End: 2024-05-06
Payer: COMMERCIAL

## 2024-05-06 PROCEDURE — 76830 TRANSVAGINAL US NON-OB: CPT

## 2024-05-17 ENCOUNTER — APPOINTMENT (OUTPATIENT)
Dept: INTERNAL MEDICINE | Facility: CLINIC | Age: 69
End: 2024-05-17
Payer: COMMERCIAL

## 2024-05-17 VITALS
DIASTOLIC BLOOD PRESSURE: 78 MMHG | HEIGHT: 60 IN | RESPIRATION RATE: 14 BRPM | TEMPERATURE: 98.3 F | OXYGEN SATURATION: 95 % | SYSTOLIC BLOOD PRESSURE: 128 MMHG | HEART RATE: 62 BPM | WEIGHT: 111 LBS | BODY MASS INDEX: 21.79 KG/M2

## 2024-05-17 DIAGNOSIS — D68.61 ANTIPHOSPHOLIPID SYNDROME: ICD-10-CM

## 2024-05-17 DIAGNOSIS — M32.9 SYSTEMIC LUPUS ERYTHEMATOSUS, UNSPECIFIED: ICD-10-CM

## 2024-05-17 PROCEDURE — 99214 OFFICE O/P EST MOD 30 MIN: CPT

## 2024-05-17 NOTE — ASSESSMENT
[FreeTextEntry1] : SLE continue plaquenil arm pain - medrol pack she will call rheumatology  HTN continue toprol XL and enalapril

## 2024-05-17 NOTE — PHYSICAL EXAM
[No Edema] : there was no peripheral edema [Normal] : affect was normal and insight and judgment were intact [de-identified] : pain in arm with some movment. tenderness axillary, shoulder area, strength normal

## 2024-05-17 NOTE — HISTORY OF PRESENT ILLNESS
[Spouse] : spouse [FreeTextEntry8] : Pt states that she has pain in her left arm. Has h/o SLE. She had it all day yesterday. Started 5/15 night. Saw her cardiologist 5/16/24. [place] like a dead or heavy arm on 5/15. She plays pickle ball. Her left leg is fine. She did lift weights 5/15 morning which does twice a week.  She states her rheumatologist is not available today - Dr Brooks.  She feels the pain has progressively gotten worse and not mainly in her armpit radiating down to her fingers. Started distally and now traveling up.  h/o APL antibody and on warfarin.

## 2024-05-18 ENCOUNTER — NON-APPOINTMENT (OUTPATIENT)
Age: 69
End: 2024-05-18

## 2024-05-24 ENCOUNTER — NON-APPOINTMENT (OUTPATIENT)
Age: 69
End: 2024-05-24

## 2024-05-28 ENCOUNTER — APPOINTMENT (OUTPATIENT)
Dept: INTERNAL MEDICINE | Facility: CLINIC | Age: 69
End: 2024-05-28
Payer: COMMERCIAL

## 2024-05-28 VITALS
OXYGEN SATURATION: 96 % | HEIGHT: 60 IN | HEART RATE: 56 BPM | WEIGHT: 110 LBS | SYSTOLIC BLOOD PRESSURE: 130 MMHG | DIASTOLIC BLOOD PRESSURE: 72 MMHG | TEMPERATURE: 98.6 F | RESPIRATION RATE: 16 BRPM | BODY MASS INDEX: 21.6 KG/M2

## 2024-05-28 DIAGNOSIS — I10 ESSENTIAL (PRIMARY) HYPERTENSION: ICD-10-CM

## 2024-05-28 PROCEDURE — 99214 OFFICE O/P EST MOD 30 MIN: CPT

## 2024-05-28 NOTE — HEALTH RISK ASSESSMENT
[Never (0 pts)] : Never (0 points) [No] : In the past 12 months have you used drugs other than those required for medical reasons? No [No falls in past year] : Patient reported no falls in the past year [Little interest or pleasure doing things] : 1) Little interest or pleasure doing things [Feeling down, depressed, or hopeless] : 2) Feeling down, depressed, or hopeless [0] : 2) Feeling down, depressed, or hopeless: Not at all (0) [PHQ-2 Negative - No further assessment needed] : PHQ-2 Negative - No further assessment needed [Never] : Never [KDL2Climf] : 0

## 2024-05-28 NOTE — PHYSICAL EXAM
[No Acute Distress] : no acute distress [Well Nourished] : well nourished [Well Developed] : well developed [Well-Appearing] : well-appearing [Normal Voice/Communication] : normal voice/communication [Normal Sclera/Conjunctiva] : normal sclera/conjunctiva [PERRL] : pupils equal round and reactive to light [EOMI] : extraocular movements intact [Normal Outer Ear/Nose] : the outer ears and nose were normal in appearance [Normal Oropharynx] : the oropharynx was normal [Normal TMs] : both tympanic membranes were normal [No JVD] : no jugular venous distention [No Lymphadenopathy] : no lymphadenopathy [Supple] : supple [Thyroid Normal, No Nodules] : the thyroid was normal and there were no nodules present [No Respiratory Distress] : no respiratory distress  [No Accessory Muscle Use] : no accessory muscle use [Clear to Auscultation] : lungs were clear to auscultation bilaterally [Normal Rate] : normal rate  [Regular Rhythm] : with a regular rhythm [Normal S1, S2] : normal S1 and S2 [No Murmur] : no murmur heard [No Carotid Bruits] : no carotid bruits [No Abdominal Bruit] : a ~M bruit was not heard ~T in the abdomen [No Varicosities] : no varicosities [Pedal Pulses Present] : the pedal pulses are present [No Edema] : there was no peripheral edema [No Palpable Aorta] : no palpable aorta [No Extremity Clubbing/Cyanosis] : no extremity clubbing/cyanosis [Soft] : abdomen soft [Non Tender] : non-tender [Non-distended] : non-distended [No Masses] : no abdominal mass palpated [No HSM] : no HSM [Normal Bowel Sounds] : normal bowel sounds [Normal Supraclavicular Nodes] : no supraclavicular lymphadenopathy [Normal Posterior Cervical Nodes] : no posterior cervical lymphadenopathy [Normal Anterior Cervical Nodes] : no anterior cervical lymphadenopathy [No CVA Tenderness] : no CVA  tenderness [No Spinal Tenderness] : no spinal tenderness [No Joint Swelling] : no joint swelling [Grossly Normal Strength/Tone] : grossly normal strength/tone [No Rash] : no rash [Coordination Grossly Intact] : coordination grossly intact [No Focal Deficits] : no focal deficits [Normal Gait] : normal gait [Deep Tendon Reflexes (DTR)] : deep tendon reflexes were 2+ and symmetric [Speech Grossly Normal] : speech grossly normal [Memory Grossly Normal] : memory grossly normal [Normal Affect] : the affect was normal [Alert and Oriented x3] : oriented to person, place, and time [Normal Mood] : the mood was normal [Normal Insight/Judgement] : insight and judgment were intact [de-identified] : scabbing left arm and hand

## 2024-05-28 NOTE — END OF VISIT
[FreeTextEntry3] : "I, Jonathon Alexander, personally scribed the services dictated to me by Dr. Andi Feliciano MD in this documentation on 05/28/2024"   "I Dr. Andi Feliciano MD, personally performed the services described in this documentation on 05/28/2024 for the patient as scribed by Jonathon Alexander in my presence. I have reviewed and verified that all the information is accurate and true."

## 2024-05-28 NOTE — HISTORY OF PRESENT ILLNESS
[Spouse] : spouse [FreeTextEntry8] : SIENA SERNA is a 69-year-old F who presents today for an acute visit complaining of a painful skin rash on her arms and hands. Pt has a hx of HTN, HLD and lupus. Pt's  reports being diagnosed with shingles over a week ago. Pt reports that the rash has become painful and is experiencing some pain in her neck and chest. oPt was started on Valtrex for seven days. Pt has been taking Gabapentin for the pain.

## 2024-05-28 NOTE — PLAN
[FreeTextEntry1] : continue medications Gabapentin Toprol Enalapril  Chronic medical conditions HTN HLD  Finished Valtrex  Increase Gabapentin 300mg TID

## 2024-05-31 ENCOUNTER — APPOINTMENT (OUTPATIENT)
Dept: INTERNAL MEDICINE | Facility: CLINIC | Age: 69
End: 2024-05-31
Payer: COMMERCIAL

## 2024-05-31 VITALS
SYSTOLIC BLOOD PRESSURE: 134 MMHG | TEMPERATURE: 97.8 F | RESPIRATION RATE: 16 BRPM | DIASTOLIC BLOOD PRESSURE: 82 MMHG | WEIGHT: 110 LBS | HEART RATE: 66 BPM | HEIGHT: 60 IN | OXYGEN SATURATION: 98 % | BODY MASS INDEX: 21.6 KG/M2

## 2024-05-31 PROCEDURE — 99213 OFFICE O/P EST LOW 20 MIN: CPT

## 2024-05-31 RX ORDER — METHYLPREDNISOLONE 4 MG/1
4 TABLET ORAL
Qty: 1 | Refills: 0 | Status: DISCONTINUED | COMMUNITY
Start: 2024-05-17 | End: 2024-05-31

## 2024-05-31 RX ORDER — MIRTAZAPINE 30 MG/1
30 TABLET, FILM COATED ORAL
Refills: 0 | Status: ACTIVE | COMMUNITY
Start: 2024-01-22

## 2024-05-31 NOTE — HISTORY OF PRESENT ILLNESS
[FreeTextEntry8] : 70 y/o F with pmhx HTN, HLD, lupus, antiphospholipid syndrome, who presents for an acute visit.   On 5/15 she had chest pain with L arm pain. Saw cardio 5/16 and 5/17 - negative cardiac workup. Subsequently she developed a shingles with numbness and tingling on her L arm going down into her L hand. She was given valtrex and a medrol dosepak which she completed. Rash on her arm has improved, she still has some residual erythema but no active vesicles on her hand. She is here with continued severe pain of her left arm. She was started on gabapentin for this which has now been uptitrated to 300mg TID and though this helped, she is still very uncomfortable. Unable to take NSAIDs as she is on A/C. She has also tried taking tylenol.   Did not receive the shingles vaccine as she was advised not to by rheumatologist.

## 2024-05-31 NOTE — PHYSICAL EXAM
[TextEntry] : Constitutional:  no acute distress and well-appearing.   Skin:  crusted over residual shingles rash on left arm down to palmar aspect of hand  Neurology:  no focal deficits.   Psychiatric:  the affect was normal and insight and judgment were intact.

## 2024-05-31 NOTE — REVIEW OF SYSTEMS
[Skin Rash] : skin rash [de-identified] : pain [TextEntry] : Constitutional, Eyes, ENT, Cardiovascular, Respiratory, Gastrointestinal, Musculoskeletal, Neurological and Psychiatric review of systems are otherwise negative except as noted in HPI.

## 2024-06-04 RX ORDER — OXYCODONE 5 MG/1
5 TABLET ORAL
Qty: 6 | Refills: 0 | Status: ACTIVE | COMMUNITY
Start: 2024-05-31 | End: 1900-01-01

## 2024-06-04 RX ORDER — DENOSUMAB 60 MG/ML
60 INJECTION SUBCUTANEOUS
Qty: 1 | Refills: 0 | Status: ACTIVE | COMMUNITY
Start: 2019-09-10

## 2024-06-11 ENCOUNTER — APPOINTMENT (OUTPATIENT)
Dept: INTERNAL MEDICINE | Facility: CLINIC | Age: 69
End: 2024-06-11
Payer: COMMERCIAL

## 2024-06-11 VITALS
TEMPERATURE: 98 F | RESPIRATION RATE: 16 BRPM | OXYGEN SATURATION: 95 % | SYSTOLIC BLOOD PRESSURE: 118 MMHG | HEART RATE: 58 BPM | WEIGHT: 111 LBS | HEIGHT: 60 IN | BODY MASS INDEX: 21.79 KG/M2 | DIASTOLIC BLOOD PRESSURE: 70 MMHG
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
## 2024-06-11 DIAGNOSIS — B02.8 ZOSTER WITH OTHER COMPLICATIONS: ICD-10-CM

## 2024-06-11 PROCEDURE — 99214 OFFICE O/P EST MOD 30 MIN: CPT

## 2024-06-11 RX ORDER — LIDOCAINE 5% 700 MG/1
5 PATCH TOPICAL
Qty: 1 | Refills: 2 | Status: ACTIVE | COMMUNITY
Start: 2024-06-11 | End: 1900-01-01

## 2024-06-11 RX ORDER — GABAPENTIN 100 MG/1
100 CAPSULE ORAL
Qty: 90 | Refills: 0 | Status: ACTIVE | COMMUNITY
Start: 2024-06-11 | End: 1900-01-01

## 2024-06-11 NOTE — PHYSICAL EXAM
[No Acute Distress] : no acute distress [Normal Voice/Communication] : normal voice/communication [No Respiratory Distress] : no respiratory distress  [No Accessory Muscle Use] : no accessory muscle use [Clear to Auscultation] : lungs were clear to auscultation bilaterally [Normal Rate] : normal rate  [Regular Rhythm] : with a regular rhythm [Normal S1, S2] : normal S1 and S2 [Soft] : abdomen soft [Non Tender] : non-tender [Non-distended] : non-distended [No Focal Deficits] : no focal deficits [Alert and Oriented x3] : oriented to person, place, and time no

## 2024-06-11 NOTE — PLAN
[FreeTextEntry1] : pt with pain along ribs s/p fall, likely contusion  pain not typical for fracture continue with pain control  pt deferring CT head as she is confident she did not hit her head  for post-herpetic neuralgia, she will taper off of the gabapentin when ready by decreasing at 100mg intervals, for now she will continue with with up to 2tabs of 300mg up to 3x a day due to her recent rise in LFTs, will hold off on Tylenol, she can use lidocaine patches instead for her pain in addition to gabapentin  repeat LFTs in 1 week

## 2024-06-11 NOTE — HISTORY OF PRESENT ILLNESS
[de-identified] : Pt fell 2 days ago, tripped and fell onto her right side. Her face hit the concrete but she did not hit her head. She has some pain in his ribcage on the right side. It is not painful when she moves but is painful when she presses the area or takes a deep breath. She is on coumadin, her INR today was 1.4. Her  gave her lovenox to bridge her. She is on abx for a UTI.   Her shingles pain is improving, she is no longer requiring oxycodone. She is taking tylenol 3 times a day in addition to the gabapentin. She had some blood work done with her rheumatologist recently, notable for elevated LFTs.

## 2024-06-21 ENCOUNTER — APPOINTMENT (OUTPATIENT)
Dept: INTERNAL MEDICINE | Facility: CLINIC | Age: 69
End: 2024-06-21
Payer: COMMERCIAL

## 2024-06-21 VITALS
TEMPERATURE: 97.4 F | OXYGEN SATURATION: 97 % | SYSTOLIC BLOOD PRESSURE: 124 MMHG | WEIGHT: 111 LBS | DIASTOLIC BLOOD PRESSURE: 68 MMHG | HEART RATE: 60 BPM | BODY MASS INDEX: 21.79 KG/M2 | HEIGHT: 60 IN | RESPIRATION RATE: 16 BRPM

## 2024-06-21 DIAGNOSIS — B02.29 OTHER POSTHERPETIC NERVOUS SYSTEM INVOLVEMENT: ICD-10-CM

## 2024-06-21 PROCEDURE — 99213 OFFICE O/P EST LOW 20 MIN: CPT

## 2024-06-21 RX ORDER — GABAPENTIN 300 MG/1
300 CAPSULE ORAL 3 TIMES DAILY
Qty: 270 | Refills: 0 | Status: ACTIVE | COMMUNITY
Start: 2024-05-28 | End: 1900-01-01

## 2024-06-21 NOTE — PHYSICAL EXAM
[No Acute Distress] : no acute distress [Normal Voice/Communication] : normal voice/communication [No Respiratory Distress] : no respiratory distress  [No Accessory Muscle Use] : no accessory muscle use [Clear to Auscultation] : lungs were clear to auscultation bilaterally [Normal Rate] : normal rate  [Regular Rhythm] : with a regular rhythm [Normal S1, S2] : normal S1 and S2 [No Murmur] : no murmur heard [No Focal Deficits] : no focal deficits [Alert and Oriented x3] : oriented to person, place, and time

## 2024-06-21 NOTE — HISTORY OF PRESENT ILLNESS
[de-identified] : Pt here for f/u of post-herpetic neuralgia. She tried to taper down on her gabapentin but doing so made her pain intermittently worse. She now continues to take gabapentin 300mg TID. She uses a lidocaine patch and capsaicin cream which helps minimally. She is off of tylenol due to recently elevated LFTs.  at bedside reports she was recently able to play cards, go out to get her nails done which she was not able to do the week prior so her pain may be improving somewhat.

## 2024-06-27 LAB
ALBUMIN SERPL ELPH-MCNC: 4 G/DL
ALP BLD-CCNC: 109 U/L
ALT SERPL-CCNC: 36 U/L
ANION GAP SERPL CALC-SCNC: 12 MMOL/L
AST SERPL-CCNC: 34 U/L
BILIRUB SERPL-MCNC: 0.4 MG/DL
BUN SERPL-MCNC: 23 MG/DL
CALCIUM SERPL-MCNC: 8.9 MG/DL
CHLORIDE SERPL-SCNC: 104 MMOL/L
CO2 SERPL-SCNC: 24 MMOL/L
CREAT SERPL-MCNC: 0.86 MG/DL
EGFR: 73 ML/MIN/1.73M2
GLUCOSE SERPL-MCNC: 92 MG/DL
POTASSIUM SERPL-SCNC: 4.5 MMOL/L
PROT SERPL-MCNC: 6.4 G/DL
SODIUM SERPL-SCNC: 140 MMOL/L

## 2024-06-28 ENCOUNTER — NON-APPOINTMENT (OUTPATIENT)
Age: 69
End: 2024-06-28

## 2024-07-17 ENCOUNTER — APPOINTMENT (OUTPATIENT)
Dept: ENDOCRINOLOGY | Facility: CLINIC | Age: 69
End: 2024-07-17

## 2024-07-22 ENCOUNTER — RX RENEWAL (OUTPATIENT)
Age: 69
End: 2024-07-22

## 2024-09-25 ENCOUNTER — APPOINTMENT (OUTPATIENT)
Dept: INTERNAL MEDICINE | Facility: CLINIC | Age: 69
End: 2024-09-25
Payer: COMMERCIAL

## 2024-09-25 VITALS
RESPIRATION RATE: 14 BRPM | HEART RATE: 64 BPM | TEMPERATURE: 98.3 F | OXYGEN SATURATION: 98 % | HEIGHT: 60 IN | SYSTOLIC BLOOD PRESSURE: 104 MMHG | DIASTOLIC BLOOD PRESSURE: 60 MMHG | BODY MASS INDEX: 21.79 KG/M2 | WEIGHT: 111 LBS

## 2024-09-25 DIAGNOSIS — D68.61 ANTIPHOSPHOLIPID SYNDROME: ICD-10-CM

## 2024-09-25 DIAGNOSIS — Z23 ENCOUNTER FOR IMMUNIZATION: ICD-10-CM

## 2024-09-25 DIAGNOSIS — I10 ESSENTIAL (PRIMARY) HYPERTENSION: ICD-10-CM

## 2024-09-25 DIAGNOSIS — E78.5 HYPERLIPIDEMIA, UNSPECIFIED: ICD-10-CM

## 2024-09-25 PROCEDURE — 99214 OFFICE O/P EST MOD 30 MIN: CPT

## 2024-09-25 PROCEDURE — G2211 COMPLEX E/M VISIT ADD ON: CPT | Mod: NC

## 2024-09-25 NOTE — ASSESSMENT
[FreeTextEntry1] : Antiphospholipid antibody-INR checked at home patient to continue Coumadin 12.5 mg 4 times per week and 310 mg 3 times per week Hypertension good control continue enalapril 5 mg daily and Toprol-XL 25 mg daily Insomnia patient followed by psychiatrist continue mirtazapine 30 mg nightly Klonopin as needed Lupus follow-up with rheumatology continue Plaquenil 200 mg daily Osteoporosis follow-up with endocrinologist continue Prolia 60 mg 2 times per year Patient to get full physical in January 2025

## 2024-09-25 NOTE — HISTORY OF PRESENT ILLNESS
[de-identified] : History of LUPUS- dr. Rodriguez History of APL- Dr. Garcia History of insomnia and anxiety- Bryon

## 2024-09-27 ENCOUNTER — APPOINTMENT (OUTPATIENT)
Dept: INTERNAL MEDICINE | Facility: CLINIC | Age: 69
End: 2024-09-27
Payer: COMMERCIAL

## 2024-09-27 PROCEDURE — 90662 IIV NO PRSV INCREASED AG IM: CPT

## 2024-09-27 PROCEDURE — G0008: CPT

## 2024-11-09 ENCOUNTER — NON-APPOINTMENT (OUTPATIENT)
Age: 69
End: 2024-11-09

## 2024-11-11 ENCOUNTER — APPOINTMENT (OUTPATIENT)
Dept: INTERNAL MEDICINE | Facility: CLINIC | Age: 69
End: 2024-11-11
Payer: COMMERCIAL

## 2024-11-11 VITALS
TEMPERATURE: 98.7 F | BODY MASS INDEX: 21.6 KG/M2 | RESPIRATION RATE: 14 BRPM | HEIGHT: 60 IN | SYSTOLIC BLOOD PRESSURE: 120 MMHG | DIASTOLIC BLOOD PRESSURE: 76 MMHG | OXYGEN SATURATION: 97 % | HEART RATE: 63 BPM | WEIGHT: 110 LBS

## 2024-11-11 DIAGNOSIS — I10 ESSENTIAL (PRIMARY) HYPERTENSION: ICD-10-CM

## 2024-11-11 DIAGNOSIS — D68.61 ANTIPHOSPHOLIPID SYNDROME: ICD-10-CM

## 2024-11-11 DIAGNOSIS — J01.80 OTHER ACUTE SINUSITIS: ICD-10-CM

## 2024-11-11 PROCEDURE — G2211 COMPLEX E/M VISIT ADD ON: CPT | Mod: NC

## 2024-11-11 PROCEDURE — 99214 OFFICE O/P EST MOD 30 MIN: CPT

## 2024-11-11 RX ORDER — CEFUROXIME AXETIL 250 MG/1
250 TABLET ORAL
Qty: 14 | Refills: 0 | Status: ACTIVE | COMMUNITY
Start: 2024-11-11 | End: 1900-01-01

## 2024-11-15 ENCOUNTER — APPOINTMENT (OUTPATIENT)
Dept: INTERNAL MEDICINE | Facility: CLINIC | Age: 69
End: 2024-11-15

## 2024-12-02 ENCOUNTER — RX RENEWAL (OUTPATIENT)
Age: 69
End: 2024-12-02

## 2025-01-01 ENCOUNTER — NON-APPOINTMENT (OUTPATIENT)
Age: 70
End: 2025-01-01

## 2025-01-06 ENCOUNTER — APPOINTMENT (OUTPATIENT)
Dept: ENDOCRINOLOGY | Facility: CLINIC | Age: 70
End: 2025-01-06

## 2025-01-08 ENCOUNTER — APPOINTMENT (OUTPATIENT)
Dept: RADIOLOGY | Facility: CLINIC | Age: 70
End: 2025-01-08
Payer: COMMERCIAL

## 2025-01-08 ENCOUNTER — OUTPATIENT (OUTPATIENT)
Dept: OUTPATIENT SERVICES | Facility: HOSPITAL | Age: 70
LOS: 1 days | End: 2025-01-08
Payer: COMMERCIAL

## 2025-01-08 ENCOUNTER — APPOINTMENT (OUTPATIENT)
Dept: ENDOCRINOLOGY | Facility: CLINIC | Age: 70
End: 2025-01-08
Payer: COMMERCIAL

## 2025-01-08 ENCOUNTER — RESULT REVIEW (OUTPATIENT)
Age: 70
End: 2025-01-08

## 2025-01-08 ENCOUNTER — LABORATORY RESULT (OUTPATIENT)
Age: 70
End: 2025-01-08

## 2025-01-08 VITALS
WEIGHT: 110 LBS | SYSTOLIC BLOOD PRESSURE: 122 MMHG | HEART RATE: 68 BPM | DIASTOLIC BLOOD PRESSURE: 84 MMHG | HEIGHT: 61 IN | OXYGEN SATURATION: 97 % | BODY MASS INDEX: 20.77 KG/M2

## 2025-01-08 DIAGNOSIS — M81.0 AGE-RELATED OSTEOPOROSIS W/OUT CURRENT PATHOLOGICAL FRACTURE: ICD-10-CM

## 2025-01-08 DIAGNOSIS — M81.0 AGE-RELATED OSTEOPOROSIS WITHOUT CURRENT PATHOLOGICAL FRACTURE: ICD-10-CM

## 2025-01-08 LAB
25(OH)D3 SERPL-MCNC: 81.5 NG/ML
ANION GAP SERPL CALC-SCNC: 11 MMOL/L
BUN SERPL-MCNC: 27 MG/DL
CALCIUM SERPL-MCNC: 9.5 MG/DL
CALCIUM SERPL-MCNC: 9.5 MG/DL
CHLORIDE SERPL-SCNC: 103 MMOL/L
CO2 SERPL-SCNC: 27 MMOL/L
CREAT SERPL-MCNC: 0.9 MG/DL
EGFR: 69 ML/MIN/1.73M2
GLUCOSE SERPL-MCNC: 93 MG/DL
PARATHYROID HORMONE INTACT: 26 PG/ML
POTASSIUM SERPL-SCNC: 4.4 MMOL/L
SODIUM SERPL-SCNC: 140 MMOL/L

## 2025-01-08 PROCEDURE — 77080 DXA BONE DENSITY AXIAL: CPT

## 2025-01-08 PROCEDURE — 99215 OFFICE O/P EST HI 40 MIN: CPT

## 2025-01-08 PROCEDURE — 77085 DXA BONE DENSITY AXL VRT FX: CPT

## 2025-01-08 PROCEDURE — 77085 DXA BONE DENSITY AXL VRT FX: CPT | Mod: 26

## 2025-01-11 LAB
ALBUMIN MFR SERPL ELPH: 59.7 %
ALBUMIN SERPL-MCNC: 3.9 G/DL
ALBUMIN/GLOB SERPL: 1.5 RATIO
ALPHA1 GLOB MFR SERPL ELPH: 4.9 %
ALPHA1 GLOB SERPL ELPH-MCNC: 0.3 G/DL
ALPHA2 GLOB MFR SERPL ELPH: 11.2 %
ALPHA2 GLOB SERPL ELPH-MCNC: 0.7 G/DL
B-GLOBULIN MFR SERPL ELPH: 10.6 %
B-GLOBULIN SERPL ELPH-MCNC: 0.7 G/DL
GAMMA GLOB FLD ELPH-MCNC: 0.9 G/DL
GAMMA GLOB MFR SERPL ELPH: 13.6 %
INTERPRETATION SERPL IEP-IMP: NORMAL
PROT SERPL-MCNC: 6.5 G/DL
PROT SERPL-MCNC: 6.5 G/DL

## 2025-01-13 ENCOUNTER — APPOINTMENT (OUTPATIENT)
Dept: ENDOCRINOLOGY | Facility: CLINIC | Age: 70
End: 2025-01-13

## 2025-01-13 LAB
CELIAC DISEASE INTERPRETATION: NORMAL
CELIAC GENE PAIRS PRESENT: NORMAL
DQ ALPHA 1: NORMAL
DQ BETA 1: NORMAL
IMMUNOGLOBULIN A (IGA): 241 MG/DL

## 2025-01-14 LAB — ALP BONE SERPL-MCNC: 10.1 UG/L

## 2025-01-24 ENCOUNTER — APPOINTMENT (OUTPATIENT)
Dept: INTERNAL MEDICINE | Facility: CLINIC | Age: 70
End: 2025-01-24
Payer: COMMERCIAL

## 2025-01-24 VITALS
HEART RATE: 58 BPM | HEIGHT: 61 IN | OXYGEN SATURATION: 97 % | RESPIRATION RATE: 14 BRPM | BODY MASS INDEX: 20.77 KG/M2 | WEIGHT: 110 LBS | TEMPERATURE: 97.9 F | SYSTOLIC BLOOD PRESSURE: 114 MMHG | DIASTOLIC BLOOD PRESSURE: 66 MMHG

## 2025-01-24 DIAGNOSIS — M32.9 SYSTEMIC LUPUS ERYTHEMATOSUS, UNSPECIFIED: ICD-10-CM

## 2025-01-24 DIAGNOSIS — Z00.00 ENCOUNTER FOR GENERAL ADULT MEDICAL EXAMINATION W/OUT ABNORMAL FINDINGS: ICD-10-CM

## 2025-01-24 DIAGNOSIS — D68.61 ANTIPHOSPHOLIPID SYNDROME: ICD-10-CM

## 2025-01-24 PROCEDURE — 99397 PER PM REEVAL EST PAT 65+ YR: CPT

## 2025-01-28 LAB
25(OH)D3 SERPL-MCNC: 81.1 NG/ML
ALBUMIN SERPL ELPH-MCNC: 4.2 G/DL
ALP BLD-CCNC: 96 U/L
ALT SERPL-CCNC: 38 U/L
ANION GAP SERPL CALC-SCNC: 14 MMOL/L
APPEARANCE: CLEAR
AST SERPL-CCNC: 34 U/L
BACTERIA: NEGATIVE /HPF
BASOPHILS # BLD AUTO: 0.04 K/UL
BASOPHILS NFR BLD AUTO: 0.7 %
BILIRUB SERPL-MCNC: 0.3 MG/DL
BILIRUBIN URINE: NEGATIVE
BLOOD URINE: NEGATIVE
BUN SERPL-MCNC: 21 MG/DL
CALCIUM SERPL-MCNC: 9.7 MG/DL
CAST: 0 /LPF
CHLORIDE SERPL-SCNC: 100 MMOL/L
CHOLEST SERPL-MCNC: 208 MG/DL
CO2 SERPL-SCNC: 27 MMOL/L
COLOR: YELLOW
CREAT SERPL-MCNC: 0.94 MG/DL
EGFR: 65 ML/MIN/1.73M2
EOSINOPHIL # BLD AUTO: 0.07 K/UL
EOSINOPHIL NFR BLD AUTO: 1.2 %
EPITHELIAL CELLS: 0 /HPF
ESTIMATED AVERAGE GLUCOSE: 108 MG/DL
FOLATE SERPL-MCNC: >20 NG/ML
GLUCOSE QUALITATIVE U: NEGATIVE MG/DL
GLUCOSE SERPL-MCNC: 77 MG/DL
HBA1C MFR BLD HPLC: 5.4 %
HCT VFR BLD CALC: 39.9 %
HDLC SERPL-MCNC: 70 MG/DL
HGB BLD-MCNC: 12.7 G/DL
IMM GRANULOCYTES NFR BLD AUTO: 0.2 %
KETONES URINE: NEGATIVE MG/DL
LDLC SERPL CALC-MCNC: 120 MG/DL
LEUKOCYTE ESTERASE URINE: ABNORMAL
LYMPHOCYTES # BLD AUTO: 1.32 K/UL
LYMPHOCYTES NFR BLD AUTO: 23.4 %
MAN DIFF?: NORMAL
MCHC RBC-ENTMCNC: 29.9 PG
MCHC RBC-ENTMCNC: 31.8 G/DL
MCV RBC AUTO: 93.9 FL
MICROSCOPIC-UA: NORMAL
MONOCYTES # BLD AUTO: 0.38 K/UL
MONOCYTES NFR BLD AUTO: 6.7 %
NEUTROPHILS # BLD AUTO: 3.82 K/UL
NEUTROPHILS NFR BLD AUTO: 67.8 %
NITRITE URINE: NEGATIVE
NONHDLC SERPL-MCNC: 138 MG/DL
PH URINE: 7
PLATELET # BLD AUTO: 238 K/UL
POTASSIUM SERPL-SCNC: 4.4 MMOL/L
PROT SERPL-MCNC: 6.8 G/DL
PROTEIN URINE: NEGATIVE MG/DL
RBC # BLD: 4.25 M/UL
RBC # FLD: 13.9 %
RED BLOOD CELLS URINE: 5 /HPF
SODIUM SERPL-SCNC: 140 MMOL/L
SPECIFIC GRAVITY URINE: 1.01
TRIGL SERPL-MCNC: 100 MG/DL
TSH SERPL-ACNC: 1.63 UIU/ML
UROBILINOGEN URINE: 0.2 MG/DL
VIT B12 SERPL-MCNC: >2000 PG/ML
WBC # FLD AUTO: 5.64 K/UL
WHITE BLOOD CELLS URINE: 1 /HPF

## 2025-02-06 ENCOUNTER — APPOINTMENT (OUTPATIENT)
Dept: ENDOCRINOLOGY | Facility: CLINIC | Age: 70
End: 2025-02-06
Payer: COMMERCIAL

## 2025-02-06 DIAGNOSIS — M81.0 AGE-RELATED OSTEOPOROSIS W/OUT CURRENT PATHOLOGICAL FRACTURE: ICD-10-CM

## 2025-02-06 PROCEDURE — 96401 CHEMO ANTI-NEOPL SQ/IM: CPT

## 2025-02-06 RX ORDER — DENOSUMAB 60 MG/ML
60 INJECTION SUBCUTANEOUS
Qty: 1 | Refills: 0 | Status: COMPLETED | OUTPATIENT
Start: 2025-02-06

## 2025-02-06 RX ADMIN — DENOSUMAB 60 MG/ML: 60 INJECTION SUBCUTANEOUS at 00:00

## 2025-04-04 ENCOUNTER — APPOINTMENT (OUTPATIENT)
Dept: OBGYN | Facility: CLINIC | Age: 70
End: 2025-04-04
Payer: COMMERCIAL

## 2025-04-04 ENCOUNTER — NON-APPOINTMENT (OUTPATIENT)
Age: 70
End: 2025-04-04

## 2025-04-04 VITALS
WEIGHT: 110 LBS | HEIGHT: 61 IN | DIASTOLIC BLOOD PRESSURE: 79 MMHG | BODY MASS INDEX: 20.77 KG/M2 | SYSTOLIC BLOOD PRESSURE: 134 MMHG

## 2025-04-04 DIAGNOSIS — N90.5 ATROPHY OF VULVA: ICD-10-CM

## 2025-04-04 DIAGNOSIS — Z01.411 ENCOUNTER FOR GYNECOLOGICAL EXAMINATION (GENERAL) (ROUTINE) WITH ABNORMAL FINDINGS: ICD-10-CM

## 2025-04-04 DIAGNOSIS — M81.0 AGE-RELATED OSTEOPOROSIS W/OUT CURRENT PATHOLOGICAL FRACTURE: ICD-10-CM

## 2025-04-04 PROCEDURE — 99397 PER PM REEVAL EST PAT 65+ YR: CPT

## 2025-04-04 PROCEDURE — 99459 PELVIC EXAMINATION: CPT

## 2025-04-04 PROCEDURE — G0444 DEPRESSION SCREEN ANNUAL: CPT | Mod: 59

## 2025-04-04 PROCEDURE — 99213 OFFICE O/P EST LOW 20 MIN: CPT | Mod: 25

## 2025-04-09 LAB — HPV HIGH+LOW RISK DNA PNL CVX: NOT DETECTED

## 2025-04-10 LAB — CYTOLOGY CVX/VAG DOC THIN PREP: ABNORMAL

## 2025-05-07 ENCOUNTER — APPOINTMENT (OUTPATIENT)
Dept: ENDOCRINOLOGY | Facility: CLINIC | Age: 70
End: 2025-05-07

## 2025-06-25 ENCOUNTER — ASOB RESULT (OUTPATIENT)
Age: 70
End: 2025-06-25

## 2025-06-25 ENCOUNTER — APPOINTMENT (OUTPATIENT)
Dept: OBGYN | Facility: CLINIC | Age: 70
End: 2025-06-25
Payer: COMMERCIAL

## 2025-06-25 PROCEDURE — 76830 TRANSVAGINAL US NON-OB: CPT

## 2025-07-18 ENCOUNTER — APPOINTMENT (OUTPATIENT)
Dept: INTERNAL MEDICINE | Facility: CLINIC | Age: 70
End: 2025-07-18
Payer: COMMERCIAL

## 2025-07-18 VITALS
HEIGHT: 60 IN | DIASTOLIC BLOOD PRESSURE: 64 MMHG | BODY MASS INDEX: 21.6 KG/M2 | RESPIRATION RATE: 14 BRPM | OXYGEN SATURATION: 96 % | TEMPERATURE: 98.1 F | WEIGHT: 110 LBS | SYSTOLIC BLOOD PRESSURE: 110 MMHG | HEART RATE: 58 BPM

## 2025-07-18 PROCEDURE — G2211 COMPLEX E/M VISIT ADD ON: CPT | Mod: NC

## 2025-07-18 PROCEDURE — 99214 OFFICE O/P EST MOD 30 MIN: CPT

## 2025-07-25 LAB
ALBUMIN SERPL ELPH-MCNC: 4 G/DL
ALP BLD-CCNC: 64 U/L
ALT SERPL-CCNC: 34 U/L
ANION GAP SERPL CALC-SCNC: 12 MMOL/L
AST SERPL-CCNC: 28 U/L
BILIRUB SERPL-MCNC: 0.4 MG/DL
BUN SERPL-MCNC: 28 MG/DL
CALCIUM SERPL-MCNC: 9.3 MG/DL
CHLORIDE SERPL-SCNC: 107 MMOL/L
CHOLEST SERPL-MCNC: 198 MG/DL
CO2 SERPL-SCNC: 24 MMOL/L
CREAT SERPL-MCNC: 0.85 MG/DL
EGFRCR SERPLBLD CKD-EPI 2021: 73 ML/MIN/1.73M2
GLUCOSE SERPL-MCNC: 90 MG/DL
HDLC SERPL-MCNC: 69 MG/DL
LDLC SERPL-MCNC: 116 MG/DL
NONHDLC SERPL-MCNC: 129 MG/DL
POTASSIUM SERPL-SCNC: 4.3 MMOL/L
PROT SERPL-MCNC: 6.2 G/DL
SODIUM SERPL-SCNC: 143 MMOL/L
TRIGL SERPL-MCNC: 70 MG/DL

## 2025-07-28 ENCOUNTER — NON-APPOINTMENT (OUTPATIENT)
Age: 70
End: 2025-07-28

## 2025-08-06 ENCOUNTER — APPOINTMENT (OUTPATIENT)
Dept: ENDOCRINOLOGY | Facility: CLINIC | Age: 70
End: 2025-08-06
Payer: COMMERCIAL

## 2025-08-06 VITALS
WEIGHT: 111 LBS | BODY MASS INDEX: 21.79 KG/M2 | DIASTOLIC BLOOD PRESSURE: 75 MMHG | HEIGHT: 60 IN | OXYGEN SATURATION: 98 % | SYSTOLIC BLOOD PRESSURE: 133 MMHG | HEART RATE: 55 BPM

## 2025-08-06 DIAGNOSIS — M81.0 AGE-RELATED OSTEOPOROSIS W/OUT CURRENT PATHOLOGICAL FRACTURE: ICD-10-CM

## 2025-08-06 PROCEDURE — 96401 CHEMO ANTI-NEOPL SQ/IM: CPT

## 2025-08-06 PROCEDURE — 99214 OFFICE O/P EST MOD 30 MIN: CPT | Mod: 25

## 2025-08-06 RX ADMIN — DENOSUMAB 60 MG/ML: 60 INJECTION SUBCUTANEOUS at 00:00

## 2025-08-07 RX ORDER — DENOSUMAB 60 MG/ML
60 INJECTION SUBCUTANEOUS
Qty: 1 | Refills: 0 | Status: COMPLETED | OUTPATIENT
Start: 2025-08-06

## 2025-08-29 ENCOUNTER — NON-APPOINTMENT (OUTPATIENT)
Age: 70
End: 2025-08-29